# Patient Record
Sex: MALE | Race: WHITE | Employment: FULL TIME | URBAN - METROPOLITAN AREA
[De-identification: names, ages, dates, MRNs, and addresses within clinical notes are randomized per-mention and may not be internally consistent; named-entity substitution may affect disease eponyms.]

---

## 2019-06-10 ENCOUNTER — OFFICE VISIT (OUTPATIENT)
Dept: ORTHOPEDIC SURGERY | Age: 64
End: 2019-06-10
Payer: COMMERCIAL

## 2019-06-10 VITALS
HEART RATE: 75 BPM | SYSTOLIC BLOOD PRESSURE: 131 MMHG | WEIGHT: 230 LBS | BODY MASS INDEX: 31.15 KG/M2 | DIASTOLIC BLOOD PRESSURE: 91 MMHG | HEIGHT: 72 IN

## 2019-06-10 DIAGNOSIS — M54.42 ACUTE LEFT-SIDED LOW BACK PAIN WITH LEFT-SIDED SCIATICA: Primary | ICD-10-CM

## 2019-06-10 PROCEDURE — 99203 OFFICE O/P NEW LOW 30 MIN: CPT | Performed by: ORTHOPAEDIC SURGERY

## 2019-06-10 RX ORDER — OMEPRAZOLE 10 MG/1
20 CAPSULE, DELAYED RELEASE ORAL
COMMUNITY

## 2019-06-10 RX ORDER — METHYLPREDNISOLONE 4 MG/1
TABLET ORAL
Qty: 1 KIT | Refills: 0 | Status: SHIPPED | OUTPATIENT
Start: 2019-06-10 | End: 2022-03-17 | Stop reason: ALTCHOICE

## 2019-06-10 ASSESSMENT — ENCOUNTER SYMPTOMS
COUGH: 1
SINUS PAIN: 1
EYES NEGATIVE: 1
GASTROINTESTINAL NEGATIVE: 1
SINUS PRESSURE: 1

## 2019-06-10 NOTE — LETTER
[facility] [address] [facility] [id number]  Phone: [phone]  Fax: [phone]    Boris Rasmussen MD    [place] 10, 2019     Jose Hooker, [address] [place] [id number]    Patient: Queta Matias  MR Number: [id number]  YOB: 1955  Date of Visit: 6/10/2019    Dear Dr. Jose Hooker:    Thank you for the request for consultation for Queta Matias to me for the evaluation of left leg and back pain. Below are the relevant portions of my assessment and plan of care. Assessment:       Plan:     Subjective:      Patient ID: Queta Matias is a 70 y.o. male. HPI  Queta Matias is seen today for evaluation of his back and left leg. He says his left leg is got pain in the quad and numbness in the knee and big toe for the last 10 days after doing some exercises. He has a history of low back surgery in 2012 by Dr. Nicole Menjivar. Pain is currently 5 out of 10. He has pain if he walks or stands for more than 30 minutes. It wakes him at night. He is using ibuprofen and heating pad. He works in sales. Review of Systems   Constitutional: Negative. HENT: Positive for sinus pressure and sinus pain. Eyes: Negative. Respiratory: Positive for cough. Cardiovascular: Negative. Gastrointestinal: Negative. Endocrine: Negative. Genitourinary: Negative. Musculoskeletal: Positive for arthralgias. Left leg and knee pain   Skin: Negative. Allergic/Immunologic: Positive for environmental allergies. Neurological: Negative. Hematological: Negative. Psychiatric/Behavioral: Negative. Objective:   Physical Exam  General Exam:    Vitals: Blood pressure (!) 131/91, pulse 75, height 6' (1.829 m), weight 230 lb (104.3 kg). Constitutional: Patient is adequately groomed with no evidence of malnutrition  Mental Status: The patient is oriented to time, place and person.   The patient's mood and affect are appropriate. Gait:  Patient walks with normal gait and station. Lymphatic: The lymphatic examination bilaterally reveals all areas to be without enlargement or induration. Vascular: Examination reveals no swelling or calf tenderness. Peripheral pulses are palpable and 2+. Neurological: The patient has good coordination. There is no weakness or sensory deficit. Skin:    Head/Neck: inspection reveals no rashes, ulcerations or lesions. Trunk:  inspection reveals no rashes, ulcerations or lesions. Right Lower Extremity: inspection reveals no rashes, ulcerations or lesions. Left Lower Extremity: inspection reveals no rashes, ulcerations or lesions. Examination of the bilateral hips reveals normal flexion and extension. There is no restriction in rotation. There is no tenderness to palpation anteriorly posteriorly or laterally. Right-sided straight leg raise is negative. He has brisk patellar and Achilles reflexes and normal strength and sensation throughout the right lower extremity. On the left side he describes altered sensation in the thigh, knee, and shin and foot. He has mild discomfort with straight leg raise but no pain with seated straight leg raise. He has no restriction of motion of the hip. Straight leg raise is limited to only about 70 degrees. X-rays were obtained today AP and lateral views of lumbar spine which show multilevel disc narrowing and trace curve. Assessment:      His symptoms today seem most consistent with lumbar radiculopathy      Plan: We will start with Medrol Dosepak and therapy. If he still has trouble in 4 weeks we will get an MRI and refer him back to his spine surgeon                                                                                                                                                   This note was created using voice recognition software.  It has been proofread, but occasionally errors remain. Please disregard these errors. They will be corrected as they are noted. If you have questions, please do not hesitate to call me. I look forward to following Tati Martinez along with you.     Sincerely,        Devendra Durán MD

## 2019-06-10 NOTE — COMMUNICATION BODY
ulcerations or lesions. Left Lower Extremity: inspection reveals no rashes, ulcerations or lesions. Examination of the bilateral hips reveals normal flexion and extension. There is no restriction in rotation. There is no tenderness to palpation anteriorly posteriorly or laterally. Right-sided straight leg raise is negative. He has brisk patellar and Achilles reflexes and normal strength and sensation throughout the right lower extremity. On the left side he describes altered sensation in the thigh, knee, and shin and foot. He has mild discomfort with straight leg raise but no pain with seated straight leg raise. He has no restriction of motion of the hip. Straight leg raise is limited to only about 70 degrees. X-rays were obtained today AP and lateral views of lumbar spine which show multilevel disc narrowing and trace curve. Assessment:      His symptoms today seem most consistent with lumbar radiculopathy      Plan: We will start with Medrol Dosepak and therapy. If he still has trouble in 4 weeks we will get an MRI and refer him back to his spine surgeon                                                                                                                                                   This note was created using voice recognition software. It has been proofread, but occasionally errors remain. Please disregard these errors. They will be corrected as they are noted.

## 2019-06-10 NOTE — PROGRESS NOTES
Lower Extremity: inspection reveals no rashes, ulcerations or lesions. Examination of the bilateral hips reveals normal flexion and extension. There is no restriction in rotation. There is no tenderness to palpation anteriorly posteriorly or laterally. Right-sided straight leg raise is negative. He has brisk patellar and Achilles reflexes and normal strength and sensation throughout the right lower extremity. On the left side he describes altered sensation in the thigh, knee, and shin and foot. He has mild discomfort with straight leg raise but no pain with seated straight leg raise. He has no restriction of motion of the hip. Straight leg raise is limited to only about 70 degrees. X-rays were obtained today AP and lateral views of lumbar spine which show multilevel disc narrowing and trace curve. Assessment:      His symptoms today seem most consistent with lumbar radiculopathy      Plan: We will start with Medrol Dosepak and therapy. If he still has trouble in 4 weeks we will get an MRI and refer him back to his spine surgeon                                                                                                                                                   This note was created using voice recognition software. It has been proofread, but occasionally errors remain. Please disregard these errors. They will be corrected as they are noted.

## 2019-06-11 ENCOUNTER — HOSPITAL ENCOUNTER (OUTPATIENT)
Dept: PHYSICAL THERAPY | Age: 64
Setting detail: THERAPIES SERIES
Discharge: HOME | End: 2019-06-11
Payer: COMMERCIAL

## 2019-06-11 PROCEDURE — 97140 MANUAL THERAPY 1/> REGIONS: CPT | Performed by: PHYSICAL THERAPIST

## 2019-06-11 PROCEDURE — 97161 PT EVAL LOW COMPLEX 20 MIN: CPT | Performed by: PHYSICAL THERAPIST

## 2019-06-11 PROCEDURE — 97110 THERAPEUTIC EXERCISES: CPT | Performed by: PHYSICAL THERAPIST

## 2019-06-11 NOTE — FLOWSHEET NOTE
ROM LEFT RIGHT Comments   Hip Flexion      Hip Abd      Hip ER      Hip IR      Hip Extension      Knee Ext      Knee Flex      Hamstring Flex      Piriformis                    Strength LEFT RIGHT Comments   Multifidus      Transverse Ab      Hip Flexors      Hip Abductors      Hip Extensors                     Myotomes Normal Abnormal Comments   Hip flexion (L1-L2)      Knee extension (L2-L4)      Dorsiflexion (L4-L5)      Great Toe Ext (L5)      Ankle Eversion (S1-S2)      Ankle PF(S1-S2)          Dermatomes Normal Abnormal Comments   inguinal area (L1)       anterior mid-thigh (L2)      distal ant thigh/med knee (L3)      medial lower leg and foot (L4)      lateral lower leg and foot (L5)      posterior calf (S1)      medial calcaneus (S2)          Neural dynamic tension testing Normal Abnormal Comments   Slump Test  - Degree of knee flexion:       SLR       0-30      30-70      Femoral nerve (L2-4)        Reflexes Normal Abnormal Comments   S1-2 Seated achilles      S1-2 Prone knee bend      L3-4 Patellar tendon      C5-6 Biceps      C6 Brachioradialis      C7-8 Triceps      Clonus      Babinski      Montelongo's            Positional Tolerance     Standing repeated flex    Standing repeated ext    Prone laying    Prone prop    Prone laying with pillow    Supine manual B LE distraction    Supine with wedge under LE              RESTRICTIONS/PRECAUTIONS: recovering from sinus infection, discectomy- (L3-4?) in 2012, knee surgery in 2015        Exercises/Interventions:     Exercise/Equipment Resistance/Repetitions Other comments   Hamstring stretch 5x:30 bilaterally   Piriformis stretch NV?     Knee to chest 10x:10 Left only   Seated on heels low back stretch     Pelvic tilts 10x:10    TrA contraction     TrA contraction with alt marches     Prone press ups     BS     B hip abd with TB          Nerve glides seated 10x:10 bilaterally        Low lumbar rotation     bridges     Cat/camel      bird/dog Opposite UE to LE isometric core     TrA/mutlifidi walk outs     TrA/multifidi push outs                         SB pikes     SB knee tucks     SB roll outs          Manual tx 10' intermittent manual traction over ball in supine           Patient Education:  Patient education on PT and plan of care including diagnosis, prognosis, treatment goals and options. Patient agrees with discussed POC and treatment and is aware of rehab process. Pt was also educated on clinic layout and use of modalities. PT gave pt business card to call if any questions. Therapeutic Exercise and NMR EXR  ? (92990) Provided verbal/tactile cueing for activities related to strengthening, flexibility, endurance, ROM  for improvements in proximal hip and core control with self care, mobility, lifting and ambulation. ? (11742) Provided verbal/tactile cueing for activities related to improving balance, coordination, kinesthetic sense, posture, motor skill, proprioception  to assist with core control in self care, mobility, lifting, and ambulation.      Therapeutic Activities:    ? (86214 or 37501) Provided verbal/tactile cueing for activities related to improving balance, coordination, kinesthetic sense, posture, motor skill, proprioception and motor activation to allow for proper function  with self care and ADLs  ? (46172) Provided training and instruction to the patient for proper core and proximal hip recruitment and positioning with ambulation re-education     Home Exercise Program:    ? (14580) Reviewed/Progressed HEP activities related to strengthening, flexibility, endurance, ROM of core, proximal hip and LE for functional self-care, mobility, lifting and ambulation   ? (30524) Reviewed/Progressed HEP activities related to improving balance, coordination, kinesthetic sense, posture, motor skill, proprioception of core, proximal hip and LE for self care, mobility, lifting, and ambulation      Manual Treatments:  PROM / STM / Oscillations-Mobs:  G-I, II, III, IV (PA's, Inf., Post.)  ? (29571) Provided manual therapy to mobilize proximal hip and LS spine soft tissue/joints for the purpose of modulating pain, promoting relaxation,  increasing ROM, reducing/eliminating soft tissue swelling/inflammation/restriction, improving soft tissue extensibility and allowing for proper ROM for normal function with self care, mobility, lifting and ambulation. Modalities:  Not performed     Charges:  Timed Code Treatment Minutes: 30'   Total Treatment Minutes: 79'     ?x EVAL (LOW) [id number]   ? EVAL (MOD) 90360   ? EVAL (HIGH) 87371   ? RE-EVAL   ?x SJ(58205) x 1    ? IONTO  ? NMR (71025) x     ? VASO  ?x Manual (19597) x1      ? Other:  ? TA x      ? Mech Traction (69874)  ? ES(attended) (79256)      ? ES (un) (64837):       GOALS:  Patient stated goal: reduce painn    Therapist goals for Patient:   Short Term Goals: To be achieved in: 2 weeks  1. Pt will be independent in HEP and progression per patient tolerance in order to prevent re-injury. 2. Patient will report pain at worst less than or equal to 4/10 to facilitate improvement in movement, function, and ADLs as indicated by functional deficits. Long Term Goals: To be achieved in: 6 weeks  1. Pt will demo a score of 12% or less for the MARITA to assist with reaching prior level of function. 2. Patient will demonstrate increased AROM to left side flex without pain to allow for proper joint functioning as indicated by patient's functional deficits. 3. Patient will deny tingling/numbness in his leg. 4. Patient will return to report pain at worst less than or equal to 3/10.  5. Pt will perform all ADLs/IADLs without an increase in pain. 6. Pt will sleep/lay down without increased pain. Progression Towards Functional goals:  ? Patient is progressing as expected towards functional goals listed. ? Progression is slowed due to complexities listed.   ? Progression has been slowed due to co-morbidities. ? Plan just implemented, too soon to assess goals progression  ? Other:     ASSESSMENT:  See eval    Treatment/Activity Tolerance:  ? Patient tolerated treatment well ? Patient limited by fatigue  ? Patient limited by pain  ? Patient limited by other medical complications  ? Other: Pt is a 69 y/o male presenting with diagnosis of low back pain with sciatica from the MD.  Clinically, the pt presents with decreased ROM, decreased strength, decreased function, and increased pain consistent with the MD diagnosis. The pt would benefit from skilled PT to return to PLOF. Pt does have positive slump test.  He has sensation in \"numb\" areas, but it is decreased. Pt is recovering from respiratory infection. He does have a sedentary job. He has h/o discectomy. He has not yet started his Dosepak. He has had a good h/o with PT. We did review pt's benefits were removed. Pt's questions were answered. Pt was agreeable. We discussed orthonet. Since it may take up to 48 hrs to hear back from them, we will have to wait to start next week as pt is unavailable on Friday. Prognosis: ? Good ? Fair  ? Poor    Patient Requires Follow-up: ? Yes  ? No    PLAN: See eval.  Consider bike, progressing core strength, monitor pt's progress with starting Dosepak and traction. ? Continue per plan of care ? Alter current plan (see comments)  ? Plan of care initiated ? Hold pending MD visit ?  Discharge    Electronically signed by: Benigno Bill DPT [id number]

## 2019-06-11 NOTE — PLAN OF CARE
[facility],[address], [address] [place], [address]  Phone: [phone]   Fax: [phone]              Physical Therapy Certification    Dear Referring Practitioner: Kirstie Villarreal,    We had the pleasure of evaluating the following patient for physical therapy services at     [address] [place] [address]. A summary of our findings can be found in the initial assessment below. This includes our plan of care. If you have any questions or concerns regarding these findings, please do not hesitate to contact me at the office phone number checked above. Thank you for the referral.       Physician Signature:_______________________________Date:__________________  By signing above (or electronic signature), therapists plan is approved by physician      Patient: Queta Matias   : 1955   MRN: [id number]  Referring Physician: Referring Practitioner: Kirstie Villarreal      Evaluation Date: 2019      Medical Diagnosis Information:  Diagnosis: M54.42 (ICD-10-CM) - Acute left-sided low back pain with left-sided sciatica   Treatment Diagnosis: M54.42 (ICD-10-CM) - Acute left-sided low back pain with left-sided sciatica          Precautions/ Contra-indications: recovering from sinus infection, discectomy- (L3-4?) in , knee surgery in   Latex Allergy:  ?NO      ? YES  Preferred Language for Healthcare:   ?English       ? other:    SUBJECTIVE: Patient stated complaint: He had discectomy in . After his last visit with the spine surgeon, they wanted to fuse it. He wants to avoid this. He has numbness in a band on his left leg around mid/lower thigh and in his big toe. He was picking up his grandson a bunch at a pool. This has been bothering him for about 2 weeks. He took ibf and saw Dr. Kirstie Villarreal as he was concerned with the numbness. He never had this before. He's still golfing. He is trying to avoid surgery if he can.     He was prescribed a Doepak, but he hasn't started it yet. He feels his right knee is a little sore like there is something floating around in there. Relevant Medical History:  recovering from sinus infection, discectomy- (L3-4?) in 2012, knee surgery in 2015  Functional Scale:    MARITA-28%, FABQ- phy-22, work-6    Pain Scale: 0/10-  pressure in his butt  Easing factors: Pain at best:  0/10. Taking ibf now  Treatment: to fill Dosepak  Provocative factors: Pain at worst:  5-6/10. At night when he lays in bed. He can lay in the recliner with less problems. He has pain with standing, but this has been a chronic issue. He can't sit in the car for a long time, but this is his normal.  He can drive for about 2 hrs in the car, and then it starts to bother him. Type: ?Constant   ? xIntermittent  ? Radiating ? Localized ? other:     Numbness/Tingling: In his great toe on left and left thigh are numb    Functional Limitations/Impairments: ?xSitting- modifying ? xStanding-chronicall limited ? xWalking -chronically limited   ? xSquatting/bending  ? Stairs           ? xADL's- putting on shoes  ? Transfers ? xSports/Recreation ? Other:    Occupation/School: sales- sitting    Living Status/Prior Level of Function: Independent with ADLs and IADLs, no numbness. Pt did not have pain.         Standing Exam Normal Abnormal N/A Comments   Toe walk   x      Heel Walk x      Side bending  x  Pressure going to the left in left low back   Pelvic Height x      Fwd Bend- (aberrant juttering or innominate mvmt)  x  limited   Extension  x  limited   Trendelenburg       Kemps/Quadrant       Stork       SLS/SLS with rotation                     Seated Exam Normal Abnormal N/A Comments   Pelvic Height x      Seated Rotation x      Seated flexion x      B hip IR x                    Supine Exam Normal Abnormal N/A Comments   Hip flexion x      Abduction x      ER       IR    Restricted bilaterally   REJI/Hans  x  More restricted on left   Hip scour SLR x      Crossed SLR x      Supine to sit       SI distraction/compression       Thigh thrust                     Prone Exam Normal Abnormal N/A Comments   Prone knee bend x      Prone hip IR  x  Right more limited than left   B Achilles reflex/Pheasant       PA/Spring  x  stiff   Prone Instability test       Sacral Spring/thrust x                      ROM LEFT RIGHT Comments   Hip Flexion      Hip Abd      Hip ER 42 36    Hip IR 32 34    Hip Extension      Knee Ext      Knee Flex      Piriformis                    Strength LEFT RIGHT Comments   Multifidus      Transverse Ab      Hip Flexors      Hip Abductors      Hip Extensors                     Myotomes Normal Abnormal Comments   Hip flexion (L1-L2) x  4- bilaterally   Knee extension (L2-L4) x     Dorsiflexion (L4-L5) x     Great Toe Ext (L5) x     Ankle Eversion (S1-S2) x     Ankle PF(S1-S2) x  Via gross assessment       Dermatomes Normal Abnormal Comments   inguinal area (L1)  x     anterior mid-thigh (L2)  x On left feels different, but he does have sensation   distal ant thigh/med knee (L3)  x On left feels different, but he does have sensation   medial lower leg and foot (L4) x     lateral lower leg and foot (L5) x     posterior calf (S1) x     medial calcaneus (S2) x       Neural dynamic tension testing Normal Abnormal Comments   Slump Test  - Degree of knee flexion:   x Symptoms replicated on left   SLR       0-30      30-70   Right 52, left- 52   Femoral nerve (L2-4)        Reflexes Normal Abnormal Comments   S1-2 Seated achilles   Diminished bilaterally   S1-2 Prone knee bend      L3-4 Patellar tendon x     C5-6 Biceps      C6 Brachioradialis      C7-8 Triceps      Clonus x     Babinski      Montelongo's        Joint mobility:    ?Normal    ?xHypo   ? Hyper    Palpation: -TTP    Functional Mobility/Transfers: see above    Posture: FWD head.   Decreased lumbar lordosis    Gait: (include devices/WB status) WFL    Bandages/Dressings/Incisions: NA    Laslett Criteria - SI Component (2/5 for positive)   ? Distraction  ? Thigh Thrust  ?Gaenslen's test  ?Compression  ? Sacral thrust     [place] Criteria - Pelvic component  (3/4 for positive or 2/4 + David's Sign)   ? Standing Flexion test  ?Seated Pelvic Landmarks   ? Supine Long Sit Test  ?Prone Knee Flexion Test  ?David's Sign       Classification driving today's treatment approach and dosing:  - Prevailing signs and symptoms consistent with:  ? Symptom Modulation Approach   ? \"Directional preference subgroup\"    ? Flexion Based      ?age >50      ?imaging evidence of lumbar stenosis     ?preference for flexion     ? Extension Based     ?symptoms distal to buttock     ?symptoms centralize with lumbar extension     ?symptoms peripheralize with lumbar flexion     ?directional preference for extension    ? Lateral Shift     ?visible frontal plane deviation     ?directional preference for lateral translation   ? \"Manipulation subgroup\"  (3/4 meets manipulation criteria)     ? symptoms less than 16 days    ? FABQ less than 19    ? Hypomobility of lumbar spine    ? IR of at least 1 hip >35 degrees    Other Factors to consider:    ?no symptoms distal to the knee    ?no red flags and minimal yellow flags    ?no contraindications to manipulation   ?x \"Traction subgroup\"      ?x signs and symptoms of nerve root compression (radiculopathy)     ?x unable to centralize distal symptoms with movement    ?x pain or numbness in the lumbar spine, buttock, and/or LE    ?peripheralization with extension movements    ?+ SLR or + crossed SLR (symptoms less than 70 degrees)  ? Movement Control Approach   ? \"Stabilization subgroup\"    ? younger age (less than 36)     ? greater general flexibility (post-partum, SLR >90)    ? abberant movements, painful arc, or John's sign with flex/ext ROM    ? positive prone instability test  ?Functional Optimization Approach   ? \"unspecified subgroup\"    ? lower disability scores     ?lower fear avoidance scores     ?longer duration of symptoms (chronic)    ? prior episodes of low back pain    ?older age                                  ? Patient history, allergies, meds reviewed. Medical chart reviewed. See intake form. Review Of Systems (ROS):  ?Performed Review of systems (Integumentary, CardioPulmonary, Neurological) by intake and observation. Intake form has been scanned into medical record. Patient has been instructed to contact their primary care physician regarding ROS issues if not already being addressed at this time. Co-morbidities/Complexities (which will affect course of rehabilitation):   ? None           Arthritic conditions   ? Rheumatoid arthritis (M05.9)  ? Osteoarthritis (M19.91)   Cardiovascular conditions   ? Hypertension (I10)  ? Hyperlipidemia (E78.5)  ? Angina pectoris (I20)  ? Atherosclerosis (I70)   Musculoskeletal conditions   ? Disc pathology   ? Congenital spine pathologies   ? Prior surgical intervention  ? Osteoporosis (M81.8)  ? Osteopenia (M85.8)   Endocrine conditions   ? Hypothyroid (E03.9)  ? Hyperthyroid Gastrointestinal conditions   ? Constipation (S64.03)   Metabolic conditions   ? Morbid obesity (E66.01)  ? Diabetes type 1(E10.65) or 2 (E11.65)   ? Neuropathy (G60.9)     Pulmonary conditions   ? Asthma (J45)  ? Coughing   ? COPD (J44.9)   Psychological Disorders  ? Anxiety (F41.9)  ? Depression (F32.9)   ? Other:   ?Other:          Barriers to/and or personal factors that will affect rehab potential:              ?Age  ? Sex              ? Motivation/Lack of Motivation                        ? Co-Morbidities              ? Cognitive Function, education/learning barriers              ? Environmental, home barriers              ? profession/work barriers  ? past PT/medical experience  ? other:  Justification: Pt is recovering from respiratory infection. He does have a sedentary job. He has h/o discectomy. He has not yet started his Dosepak. He has had a good h/o with PT. Falls Risk Assessment (30 days):   ?  Falls Risk assessed and no intervention required. ? Falls Risk assessed and Patient requires intervention due to being higher risk   TUG score (>12s at risk):     ? Falls education provided, including           ASSESSMENT:   Functional Impairments:     ?xNoted lumbar/proximal hip hypomobility   ? xNoted lumbosacral and/or generalized hypermobility   ? Decreased Lumbosacral/hip/LE functional ROM   ?xDecreased core/proximal hip strength and neuromuscular control    ? Decreased LE functional strength    ? Abnormal reflexes/sensation/myotomal/dermatomal deficits  ? Reduced balance/proprioceptive control    ?other:      Functional Activity Limitations (from functional questionnaire and intake)   ? xReduced ability to tolerate prolonged functional positions   ? Reduced ability or difficulty with changes of positions or transfers between positions   ? xReduced ability to maintain good posture and demonstrate good body mechanics with sitting, bending, and lifting   ?s Reduced ability to sleep   ?s Reduced ability or tolerance with driving and/or computer work   ?xReduced ability to perform lifting, reaching, carrying tasks   ? xReduced ability to squat   ? Reduced ability to forward bend   ? xReduced ability to ambulate prolonged functional periods/distances/surfaces   ? Reduced ability to ascend/descend stairs   ? other:       Participation Restrictions   ? xReduced participation in self care activities   ? Reduced participation in home management activities   ? Reduced participation in work activities   ? Reduced participation in social activities. ? xReduced participation in [address] [place] [address] activities. Classification:   ?Signs/symptoms consistent with Lumbar instability/stabilization subgroup. ?Signs/symptoms consistent with Lumbar mobilization/manipulation subgroup, myotomes and dermatomes intact. Meets manipulation criteria. ? Signs/symptoms consistent with Lumbar direction specific/centralization subgroup   ? Signs/symptoms consistent with Lumbar traction subgroup     ? Signs/symptoms consistent with lumbar facet dysfunction   ? Signs/symptoms consistent with lumbar stenosis type dysfunction   ? Signs/symptoms consistent with nerve root involvement including myotome & dermatome dysfunction   ? Signs/symptoms consistent with post-surgical status including: decreased ROM, strength and function. ?signs/symptoms consistent with pathology which may benefit from Dry needling     ?other: Pt is a 71 y/o male presenting with diagnosis of low back pain with sciatica from the MD.  Clinically, the pt presents with decreased ROM, decreased strength, decreased function, and increased pain consistent with the MD diagnosis. The pt would benefit from skilled PT to return to PLOF. Pt does have positive slump test.  He has sensation in \"numb\" areas, but it is decreased. Pt is recovering from respiratory infection. He does have a sedentary job. He has h/o discectomy. He has not yet started his Dosepak. He has had a good h/o with PT. We did review pt's benefits were removed. Pt's questions were answered. Pt was agreeable. Prognosis/Rehab Potential:      ?Excellent   ?xGood    ? Fair   ? Poor    Tolerance of evaluation/treatment:    ?Excellent   ?xGood    ? Fair   ? Poor    PLAN: Begin PT focusing on: proximal hip mobilizations, LB mobs, LB core activation, proximal hip activation, and HEP    Frequency/Duration:  1-2 days per week for 4-6 Weeks:  Interventions:  ?  Therapeutic exercise including: strength training, ROM, for LE, Glutes and core   ? NMR activation and proprioception for glutes , LE and Core   ? Manual therapy as indicated for Hip complex, LE and spine to include: Dry Needling/IASTM, STM, PROM, Gr I-IV mobilizations, manipulation. ? Modalities as needed that may include: thermal agents, E-stim, Biofeedback, US, iontophoresis as indicated  ?   Patient education on joint protection, postural re-education, activity modification, progression of HEP. HEP instruction: (see scanned forms)    GOALS:  Patient stated goal: reduce painn    Therapist goals for Patient:   Short Term Goals: To be achieved in: 2 weeks  1. Pt will be independent in HEP and progression per patient tolerance in order to prevent re-injury. 2. Patient will report pain at worst less than or equal to 4/10 to facilitate improvement in movement, function, and ADLs as indicated by functional deficits. Long Term Goals: To be achieved in: 6 weeks  1. Pt will demo a score of 12% or less for the MARITA to assist with reaching prior level of function. 2. Patient will demonstrate increased AROM to left side flex without pain to allow for proper joint functioning as indicated by patient's functional deficits. 3. Patient will deny tingling/numbness in his leg. 4. Patient will return to report pain at worst less than or equal to 3/10.  5. Pt will perform all ADLs/IADLs without an increase in pain. 6. Pt will sleep/lay down without increased pain. Physical Therapy Evaluation Complexity Justification  ? A history of present problem with:  ? no personal factors and/or comorbidities that impact the plan of care;  ?x1-2 personal factors and/or comorbidities that impact the plan of care  ? 3 personal factors and/or comorbidities that impact the plan of care  ? An examination of body systems using standardized tests and measures addressing any of the following: body structures and functions (impairments), activity limitations, and/or participation restrictions;:  ? a total of 1-2 or more elements   ? a total of 3 or more elements   ? xa total of 4 or more elements   ? A clinical presentation with:  ?x stable and/or uncomplicated characteristics   ?  evolving clinical presentation with changing characteristics  ? unstable and unpredictable characteristics;   ? Clinical decision making of ? xlow, ? moderate, ? high complexity using standardized patient assessment instrument and/or measurable assessment of functional outcome. ? x EVAL (LOW) 32024 (typically 20 minutes face-to-face)  ? EVAL (MOD) 01271 (typically 30 minutes face-to-face)  ? EVAL (HIGH) 94542 (typically 45 minutes face-to-face)  ?  Len Ayers [id number]      Electronically signed by:  Lisa Brennan

## 2019-06-17 ENCOUNTER — HOSPITAL ENCOUNTER (OUTPATIENT)
Dept: PHYSICAL THERAPY | Age: 64
Setting detail: THERAPIES SERIES
Discharge: HOME | End: 2019-06-17
Payer: COMMERCIAL

## 2019-06-17 PROCEDURE — 97140 MANUAL THERAPY 1/> REGIONS: CPT | Performed by: PHYSICAL THERAPIST

## 2019-06-17 PROCEDURE — 97110 THERAPEUTIC EXERCISES: CPT | Performed by: PHYSICAL THERAPIST

## 2019-06-17 PROCEDURE — 97112 NEUROMUSCULAR REEDUCATION: CPT | Performed by: PHYSICAL THERAPIST

## 2019-06-17 NOTE — FLOWSHEET NOTE
Orthopaedics and [address] [place] [address] DR ENRIQUE CHAWLA    Physical Therapy Daily Treatment Note  Date:  2019    Patient Name:  [facility]    :  1955  MRN: [id number]  Medical/Treatment Diagnosis Information:  · Diagnosis: M54.42 (ICD-10-CM) - Acute left-sided low back pain with left-sided sciatica  · Treatment Diagnosis: M54.42 (ICD-10-CM) - Acute left-sided low back pain with left-sided sciatica  Insurance/Certification information:  PT Insurance Information: [employer]  Physician Information:  Referring Practitioner: [facility] of care signed (Y/N):     Date of Patient follow up with Physician: 2019    Functional Scale:  2019    MARITA-28%    Progress Note: ?  Yes  ? No  Next due by: Visit #10  Or 2019    Latex Allergy:  ?NO      ? YES  Preferred Language for Healthcare:   ?English       ? other:    Visit # Insurance Allowable   1 6 visits approved through 19; [facility] - 20 PCY     Pain level:  0/10     SUBJECTIVE:  Pt reports that he felt better after last session. He said the traction and the dosepak seemed to help. He doesn't really have pain anymore just numbness in front of thigh. He had more pain after golfing in [place]-medial thigh.      OBJECTIVE: See eval      Standing Exam Normal Abnormal N/A Comments   Toe walk         Heel Walk       Side bending       Pelvic Height       Fwd Bend- (aberrant juttering or innominate mvmt)       Extension       Trendelenburg       Kemps/Quadrant       Stork       SLS/SLS w rotation                     Seated Exam Normal Abnormal N/A Comments   Pelvic Height       Seated Rotation       Seated flexion       B hip IR                     Supine Exam Normal Abnormal N/A Comments   Hip flexion       Abduction       ER       IR       REJI/Hans       Hip scour       SLR       Crossed SLR       Supine to sit       SI distraction/compression       Hip thrust                     Prone Exam Normal Abnormal N/A Comments   Prone knee bend Prone hip IR       B Achilles reflex/Pheasant       PA/Spring       Prone Instability test       Sacral Spring/thrust                       ROM LEFT RIGHT Comments   Lumbar Flex      Lumbar Ext      Side Bend      Rotation                    ROM LEFT RIGHT Comments   Hip Flexion      Hip Abd      Hip ER      Hip IR      Hip Extension      Knee Ext      Knee Flex      Hamstring Flex      Piriformis                    Strength LEFT RIGHT Comments   Multifidus      Transverse Ab      Hip Flexors      Hip Abductors      Hip Extensors                     Myotomes Normal Abnormal Comments   Hip flexion (L1-L2)      Knee extension (L2-L4)      Dorsiflexion (L4-L5)      Great Toe Ext (L5)      Ankle Eversion (S1-S2)      Ankle PF(S1-S2)          Dermatomes Normal Abnormal Comments   inguinal area (L1)       anterior mid-thigh (L2)      distal ant thigh/med knee (L3)      medial lower leg and foot (L4)      lateral lower leg and foot (L5)      posterior calf (S1)      medial calcaneus (S2)          Neural dynamic tension testing Normal Abnormal Comments   Slump Test  - Degree of knee flexion:       SLR       0-30      30-70      Femoral nerve (L2-4)        Reflexes Normal Abnormal Comments   S1-2 Seated achilles      S1-2 Prone knee bend      L3-4 Patellar tendon      C5-6 Biceps      C6 Brachioradialis      C7-8 Triceps      Clonus      Babinski      Montelongo's            Positional Tolerance     Standing repeated flex    Standing repeated ext    Prone laying    Prone prop    Prone laying with pillow    Supine manual B LE distraction    Supine with wedge under LE              RESTRICTIONS/PRECAUTIONS: recovering from sinus infection, discectomy- (L3-4?) in 2012, knee surgery in 2015        Exercises/Interventions:     Exercise/Equipment Resistance/Repetitions Other comments   bike X 5 mins    Hamstring stretch 3x:30 bilaterally   Piriformis stretch Unable to cross and pull    Knee to chest 10x:10 Left only   Seated on heels low back stretch     Pelvic tilts 10x:10    TrA contraction     TrA contraction with alt marches 3 x 5  B    Prone press ups     BS 10 x 10 secs     B hip abd with TB 3 x 10 black TB         Nerve glides seated 10x:10 bilaterally        Low lumbar rotation 5 x 10 secs B    bridges     Cat/camel      bird/dog     Opposite UE to LE isometric core     TrA/mutlifidi walk outs     TrA/multifidi push outs                         SB pikes     SB knee tucks     SB roll outs          Manual tx 10' intermittent manual traction over ball in supine           Patient Education:  Patient education on PT and plan of care including diagnosis, prognosis, treatment goals and options. Patient agrees with discussed POC and treatment and is aware of rehab process. Pt was also educated on clinic layout and use of modalities. PT gave pt business card to call if any questions. Therapeutic Exercise and NMR EXR  ? (34916) Provided verbal/tactile cueing for activities related to strengthening, flexibility, endurance, ROM  for improvements in proximal hip and core control with self care, mobility, lifting and ambulation. ? (55360) Provided verbal/tactile cueing for activities related to improving balance, coordination, kinesthetic sense, posture, motor skill, proprioception  to assist with core control in self care, mobility, lifting, and ambulation.      Therapeutic Activities:    ? (16197 or 56747) Provided verbal/tactile cueing for activities related to improving balance, coordination, kinesthetic sense, posture, motor skill, proprioception and motor activation to allow for proper function  with self care and ADLs  ? (06778) Provided training and instruction to the patient for proper core and proximal hip recruitment and positioning with ambulation re-education     Home Exercise Program:    ? (17494) Reviewed/Progressed HEP activities related to strengthening, flexibility, endurance, ROM of core, proximal hip and LE for functional self-care, mobility, lifting and ambulation   ? (01626) Reviewed/Progressed HEP activities related to improving balance, coordination, kinesthetic sense, posture, motor skill, proprioception of core, proximal hip and LE for self care, mobility, lifting, and ambulation      Manual Treatments:  PROM / STM / Oscillations-Mobs:  G-I, II, III, IV (PA's, Inf., Post.)  x? (39010) Provided manual therapy to mobilize proximal hip and LS spine soft tissue/joints for the purpose of modulating pain, promoting relaxation,  increasing ROM, reducing/eliminating soft tissue swelling/inflammation/restriction, improving soft tissue extensibility and allowing for proper ROM for normal function with self care, mobility, lifting and ambulation. Modalities:  Not performed     Charges:  Timed Code Treatment Minutes: 42   Total Treatment Minutes: 52     ? EVAL (LOW) 51071   ? EVAL (MOD) 95907   ? EVAL (HIGH) 50327   ? RE-EVAL   ?x AL(33196) x 1    ? IONTO  ? xNMR (69431) x   1  ? VASO  ?x Manual (42126) x1      ? Other:  ? TA x      ? Mech Traction (12329)  ? ES(attended) (24339)      ? ES (un) (38756):       GOALS:  Patient stated goal: reduce painn    Therapist goals for Patient:   Short Term Goals: To be achieved in: 2 weeks  1. Pt will be independent in HEP and progression per patient tolerance in order to prevent re-injury. 2. Patient will report pain at worst less than or equal to 4/10 to facilitate improvement in movement, function, and ADLs as indicated by functional deficits. Long Term Goals: To be achieved in: 6 weeks  1. Pt will demo a score of 12% or less for the MARITA to assist with reaching prior level of function. 2. Patient will demonstrate increased AROM to left side flex without pain to allow for proper joint functioning as indicated by patient's functional deficits. 3. Patient will deny tingling/numbness in his leg.   4. Patient will return to report pain at worst less than or equal to 3/10.  5. Pt

## 2019-06-20 ENCOUNTER — HOSPITAL ENCOUNTER (OUTPATIENT)
Dept: PHYSICAL THERAPY | Age: 64
Setting detail: THERAPIES SERIES
Discharge: HOME | End: 2019-06-20
Payer: COMMERCIAL

## 2019-06-20 PROCEDURE — 97140 MANUAL THERAPY 1/> REGIONS: CPT | Performed by: PHYSICAL THERAPIST

## 2019-06-20 PROCEDURE — 97110 THERAPEUTIC EXERCISES: CPT | Performed by: PHYSICAL THERAPIST

## 2019-06-20 PROCEDURE — 97112 NEUROMUSCULAR REEDUCATION: CPT | Performed by: PHYSICAL THERAPIST

## 2019-06-20 NOTE — FLOWSHEET NOTE
Orthopaedics and [address] [place] [address] DR ENRIQUE CHAWLA    Physical Therapy Daily Treatment Note  Date:  2019    Patient Name:  Jaleel Gonzalez    :  1955  MRN: [id number]  Medical/Treatment Diagnosis Information:  · Diagnosis: M54.42 (ICD-10-CM) - Acute left-sided low back pain with left-sided sciatica  · Treatment Diagnosis: M54.42 (ICD-10-CM) - Acute left-sided low back pain with left-sided sciatica  Insurance/Certification information:  PT Insurance Information: [place]/[employer]  Physician Information:  Referring Practitioner: [address] [facility] of care signed (Y/N):     Date of Patient follow up with Physician: 2019    Functional Scale:  2019    MARITA-28%    Progress Note: ?  Yes  ? No  Next due by: Visit #10  Or 2019    Latex Allergy:  ?NO      ? YES  Preferred Language for Healthcare:   ?English       ? other:    Visit # Insurance Allowable   2 6 visits approved through 19; [facility] - 20 PCY     Pain level:  0/10     SUBJECTIVE:  Pt is having more pain at night in medial thigh and into medial knee. He felt fine after last session.  He is still doing exercises at home    OBJECTIVE: See eval      Standing Exam Normal Abnormal N/A Comments   Toe walk         Heel Walk       Side bending       Pelvic Height       Fwd Bend- (aberrant juttering or innominate mvmt)       Extension       Trendelenburg       Kemps/Quadrant       Stork       SLS/SLS w rotation                     Seated Exam Normal Abnormal N/A Comments   Pelvic Height       Seated Rotation       Seated flexion       B hip IR                     Supine Exam Normal Abnormal N/A Comments   Hip flexion       Abduction       ER       IR       REJI/Hans       Hip scour       SLR       Crossed SLR       Supine to sit       SI distraction/compression       Hip thrust                     Prone Exam Normal Abnormal N/A Comments   Prone knee bend       Prone hip IR       B Achilles reflex/Pheasant       PA/Spring       Prone with alt marches 3 x 5  B    Prone press ups     BS 10 x 10 secs     B hip abd with TB 3 x 10 black TB         Nerve glides seated 10x:10 bilaterally        Low lumbar rotation 5 x 10 secs B    bridges 3 x 5 hold 3 secs    Cat/camel      bird/dog     Opposite UE to LE isometric core     TrA/mutlifidi walk outs     TrA/multifidi push outs                         SB pikes     SB knee tucks     SB roll outs          Manual tx 8' intermittent manual traction over ball in supine           Patient Education:  Patient education on PT and plan of care including diagnosis, prognosis, treatment goals and options. Patient agrees with discussed POC and treatment and is aware of rehab process. Pt was also educated on clinic layout and use of modalities. PT gave pt business card to call if any questions. Therapeutic Exercise and NMR EXR  ? (19335) Provided verbal/tactile cueing for activities related to strengthening, flexibility, endurance, ROM  for improvements in proximal hip and core control with self care, mobility, lifting and ambulation. ? (07174) Provided verbal/tactile cueing for activities related to improving balance, coordination, kinesthetic sense, posture, motor skill, proprioception  to assist with core control in self care, mobility, lifting, and ambulation.      Therapeutic Activities:    ? (45192 or 57922) Provided verbal/tactile cueing for activities related to improving balance, coordination, kinesthetic sense, posture, motor skill, proprioception and motor activation to allow for proper function  with self care and ADLs  ? (57991) Provided training and instruction to the patient for proper core and proximal hip recruitment and positioning with ambulation re-education     Home Exercise Program:    ? (12545) Reviewed/Progressed HEP activities related to strengthening, flexibility, endurance, ROM of core, proximal hip and LE for functional self-care, mobility, lifting and ambulation   ? (10774) Reviewed/Progressed HEP activities related to improving balance, coordination, kinesthetic sense, posture, motor skill, proprioception of core, proximal hip and LE for self care, mobility, lifting, and ambulation      Manual Treatments:  PROM / STM / Oscillations-Mobs:  G-I, II, III, IV (PA's, Inf., Post.)  x? (68725) Provided manual therapy to mobilize proximal hip and LS spine soft tissue/joints for the purpose of modulating pain, promoting relaxation,  increasing ROM, reducing/eliminating soft tissue swelling/inflammation/restriction, improving soft tissue extensibility and allowing for proper ROM for normal function with self care, mobility, lifting and ambulation. Modalities:  Not performed     Charges:  Timed Code Treatment Minutes: 45   Total Treatment Minutes: 48     ? EVAL (LOW) 22050   ? EVAL (MOD) 42467   ? EVAL (HIGH) 93039   ? RE-EVAL   ?x WR(49604) x 1    ? IONTO  ? xNMR (64865) x   1  ? VASO  ?x Manual (54706) x1      ? Other:  ? TA x      ? Mech Traction (22578)  ? ES(attended) (26042)      ? ES (un) (82930):       GOALS:  Patient stated goal: reduce painn    Therapist goals for Patient:   Short Term Goals: To be achieved in: 2 weeks  1. Pt will be independent in HEP and progression per patient tolerance in order to prevent re-injury. 2. Patient will report pain at worst less than or equal to 4/10 to facilitate improvement in movement, function, and ADLs as indicated by functional deficits. Long Term Goals: To be achieved in: 6 weeks  1. Pt will demo a score of 12% or less for the MARITA to assist with reaching prior level of function. 2. Patient will demonstrate increased AROM to left side flex without pain to allow for proper joint functioning as indicated by patient's functional deficits. 3. Patient will deny tingling/numbness in his leg. 4. Patient will return to report pain at worst less than or equal to 3/10.  5. Pt will perform all ADLs/IADLs without an increase in pain.   6. Pt will

## 2019-06-25 ENCOUNTER — HOSPITAL ENCOUNTER (OUTPATIENT)
Dept: PHYSICAL THERAPY | Age: 64
Setting detail: THERAPIES SERIES
Discharge: HOME | End: 2019-06-25
Payer: COMMERCIAL

## 2019-06-25 PROCEDURE — 97110 THERAPEUTIC EXERCISES: CPT | Performed by: PHYSICAL THERAPIST

## 2019-06-25 PROCEDURE — 97112 NEUROMUSCULAR REEDUCATION: CPT | Performed by: PHYSICAL THERAPIST

## 2019-06-25 PROCEDURE — 97140 MANUAL THERAPY 1/> REGIONS: CPT | Performed by: PHYSICAL THERAPIST

## 2019-06-25 NOTE — FLOWSHEET NOTE
PA/Spring       Prone Instability test       Sacral Spring/thrust                       ROM LEFT RIGHT Comments   Lumbar Flex      Lumbar Ext      Side Bend      Rotation                    ROM LEFT RIGHT Comments   Hip Flexion      Hip Abd      Hip ER      Hip IR      Hip Extension      Knee Ext      Knee Flex      Hamstring Flex      Piriformis                    Strength LEFT RIGHT Comments   Multifidus      Transverse Ab      Hip Flexors      Hip Abductors      Hip Extensors                     Myotomes Normal Abnormal Comments   Hip flexion (L1-L2)      Knee extension (L2-L4)      Dorsiflexion (L4-L5)      Great Toe Ext (L5)      Ankle Eversion (S1-S2)      Ankle PF(S1-S2)          Dermatomes Normal Abnormal Comments   inguinal area (L1)       anterior mid-thigh (L2)      distal ant thigh/med knee (L3)      medial lower leg and foot (L4)      lateral lower leg and foot (L5)      posterior calf (S1)      medial calcaneus (S2)          Neural dynamic tension testing Normal Abnormal Comments   Slump Test  - Degree of knee flexion:       SLR       0-30      30-70      Femoral nerve (L2-4)        Reflexes Normal Abnormal Comments   S1-2 Seated achilles      S1-2 Prone knee bend      L3-4 Patellar tendon      C5-6 Biceps      C6 Brachioradialis      C7-8 Triceps      Clonus      Babinski      Montelongo's            Positional Tolerance     Standing repeated flex    Standing repeated ext    Prone laying    Prone prop    Prone laying with pillow    Supine manual B LE distraction    Supine with wedge under LE              RESTRICTIONS/PRECAUTIONS: recovering from sinus infection, discectomy- (L3-4?) in 2012, knee surgery in 2015        Exercises/Interventions:     Exercise/Equipment Resistance/Repetitions Other comments   bike X 5 mins    Hamstring stretch 3x:30 bilaterally   Piriformis stretch Unable to cross and pull    Knee to chest 10x:10 Left only   Seated on heels low back stretch     Pelvic tilts 1    TrA contraction 10x:10 Modified plantagrade  Also in hooklying with biofeedback   TrA contraction with alt marches 3 x 5  B Held due to inability to do properly. Did ab braces with biofeedback instead   Prone press ups     gastroc stretch 5x:30             SLR ABD 3x10    SLR ADD 3x10    Eversion isometric 10x:10              Nerve glides seated 10x:10 bilaterally   retlouping 20x Hold NV   Low lumbar rotation 5 x 10 secs B    bridges 3 x 5 hold 3 secs    Cat/camel      bird/dog     Opposite UE to LE isometric core     TrA/mutlifidi walk outs     TrA/multifidi push outs                         SB pikes     SB knee tucks     SB roll outs          Manual tx 10' intermittent manual traction over ball in supine           Patient Education:  Patient education on PT and plan of care including diagnosis, prognosis, treatment goals and options. Patient agrees with discussed POC and treatment and is aware of rehab process. Pt was also educated on clinic layout and use of modalities. PT gave pt business card to call if any questions. Therapeutic Exercise and NMR EXR  ? (97007) Provided verbal/tactile cueing for activities related to strengthening, flexibility, endurance, ROM  for improvements in proximal hip and core control with self care, mobility, lifting and ambulation. ? (81809) Provided verbal/tactile cueing for activities related to improving balance, coordination, kinesthetic sense, posture, motor skill, proprioception  to assist with core control in self care, mobility, lifting, and ambulation.      Therapeutic Activities:    ? (54005 or 24383) Provided verbal/tactile cueing for activities related to improving balance, coordination, kinesthetic sense, posture, motor skill, proprioception and motor activation to allow for proper function  with self care and ADLs  ? (36498) Provided training and instruction to the patient for proper core and proximal hip recruitment and positioning with ambulation re-education Home Exercise Program:    ? (73225) Reviewed/Progressed HEP activities related to strengthening, flexibility, endurance, ROM of core, proximal hip and LE for functional self-care, mobility, lifting and ambulation   ? (87822) Reviewed/Progressed HEP activities related to improving balance, coordination, kinesthetic sense, posture, motor skill, proprioception of core, proximal hip and LE for self care, mobility, lifting, and ambulation      Manual Treatments:  PROM / STM / Oscillations-Mobs:  G-I, II, III, IV (PA's, Inf., Post.)  x? (15047) Provided manual therapy to mobilize proximal hip and LS spine soft tissue/joints for the purpose of modulating pain, promoting relaxation,  increasing ROM, reducing/eliminating soft tissue swelling/inflammation/restriction, improving soft tissue extensibility and allowing for proper ROM for normal function with self care, mobility, lifting and ambulation. Modalities:  Not performed     Charges:   Timed Code Treatment Minutes: 45'   Total Treatment Minutes: 76'     ? EVAL (LOW) 73717   ? EVAL (MOD) 69628   ? EVAL (HIGH) 04493   ? RE-EVAL   ?x IE(58392) x 1    ? IONTO  ? xNMR (14482) x   1  ? VASO  ?x Manual (52602) x1      ? Other:  ? TA x      ? Mech Traction (49725)  ? ES(attended) (38776)      ? ES (un) (61915):       GOALS:  Patient stated goal: reduce painn    Therapist goals for Patient:   Short Term Goals: To be achieved in: 2 weeks  1. Pt will be independent in HEP and progression per patient tolerance in order to prevent re-injury. 2. Patient will report pain at worst less than or equal to 4/10 to facilitate improvement in movement, function, and ADLs as indicated by functional deficits. Long Term Goals: To be achieved in: 6 weeks  1. Pt will demo a score of 12% or less for the MARITA to assist with reaching prior level of function.    2. Patient will demonstrate increased AROM to left side flex without pain to allow for proper joint functioning as indicated by patient's functional deficits. 3. Patient will deny tingling/numbness in his leg. 4. Patient will return to report pain at worst less than or equal to 3/10.  5. Pt will perform all ADLs/IADLs without an increase in pain. 6. Pt will sleep/lay down without increased pain. Progression Towards Functional goals:  ? Patient is progressing as expected towards functional goals listed. ? Progression is slowed due to complexities listed. ? Progression has been slowed due to co-morbidities. ? Plan just implemented, too soon to assess goals progression  ? Other:     ASSESSMENT:  See eval    Treatment/Activity Tolerance:  ? Patient tolerated treatment well ? Patient limited by fatigue  ? Patient limited by pain  ? Patient limited by other medical complications  x? Other: Pt dave tx well. He has a difficult time isolating Transverse abdominus from rectus. We did try in modified plantigrade, which he was improving on. Pt was TTP over peroneals, which he reports is the same symptoms he got from his back. He has no c/o radiating pain over his thigh though. He will continue to benefit from manual traction and exercise progressions      Prognosis: ? Good ? Fair  ? Poor    Patient Requires Follow-up: ? Yes  ? No    PLAN: See eval.  continue with traction; Consider requesting more visits. Rafat Perez may be changing next month though. ?  Continue per plan of care ? Alter current plan (see comments)   Plan of care initiated ? Hold pending MD visit ?  Discharge    Electronically signed by: Hortensia Peterson DPT [id number]

## 2019-06-27 ENCOUNTER — HOSPITAL ENCOUNTER (OUTPATIENT)
Dept: PHYSICAL THERAPY | Age: 64
Setting detail: THERAPIES SERIES
Discharge: HOME | End: 2019-06-27
Payer: COMMERCIAL

## 2019-06-27 PROCEDURE — 97112 NEUROMUSCULAR REEDUCATION: CPT | Performed by: PHYSICAL THERAPIST

## 2019-06-27 PROCEDURE — 97110 THERAPEUTIC EXERCISES: CPT | Performed by: PHYSICAL THERAPIST

## 2019-06-27 PROCEDURE — 97140 MANUAL THERAPY 1/> REGIONS: CPT | Performed by: PHYSICAL THERAPIST

## 2019-06-27 NOTE — FLOWSHEET NOTE
Orthopaedics and [address] [place] [address] DR ENRIQUE CHAWLA    Physical Therapy Daily Treatment Note  Date:  2019    Patient Name:  Reginald Lopez    :  1955  MRN: [id number]  Medical/Treatment Diagnosis Information:  · Diagnosis: M54.42 (ICD-10-CM) - Acute left-sided low back pain with left-sided sciatica  · Treatment Diagnosis: M54.42 (ICD-10-CM) - Acute left-sided low back pain with left-sided sciatica  Insurance/Certification information:  PT Insurance Information: [place]/[employer]  Physician Information:  Referring Practitioner: [address] [facility] of care signed (Y/N):     Date of Patient follow up with Physician: 2019    Functional Scale:  2019    MARITA-28%    Progress Note: ?  Yes  ? No  Next due by: Visit #10  Or 2019    Latex Allergy:  ?NO      ? YES  Preferred Language for Healthcare:   ?English       ? other:    Visit # Insurance Allowable   4 (5 total) 6 visits approved through 19; [facility] - 20 PCY     Pain level:  5/10     SUBJECTIVE:  It's painful today. It's on the inside of his knee. He doesn't feel the calf stretch helped. Pt feels 70% return to normal.  He had to sit in a hard chair for a couple hrs last night, and that really bothered him.       OBJECTIVE: 2019     -TTP over medial knee-VMO or peroneals or low back       Standing Exam Normal Abnormal N/A Comments   Toe walk         Heel Walk       Side bending       Pelvic Height       Fwd Bend- (aberrant juttering or innominate mvmt)       Extension       Trendelenburg       Kemps/Quadrant       Stork       SLS/SLS w rotation                     Seated Exam Normal Abnormal N/A Comments   Pelvic Height       Seated Rotation       Seated flexion       B hip IR                     Supine Exam Normal Abnormal N/A Comments   Hip flexion       Abduction       ER       IR       REJI/Hans       Hip scour       SLR       Crossed SLR       Supine to sit       SI distraction/compression       Hip thrust to cross and pull    Knee to chest 10x:10 Left only   Seated on heels low back stretch     Pelvic tilts 1    TrA contraction 10x:10 Modified plantagrade  Also in hooklying with biofeedback   TrA contraction with alt marches Prone press ups     gastroc stretch              SLR ABD 3x10    SLR ADD 3x10                   Nerve glides seated 10x:10 bilaterally   retlouping     Low lumbar rotation 10 x 10 secs B    bridges 3 x 5 hold 3 secs    Cat/camel      bird/dog     Opposite UE to LE isometric core     TrA/mutlifidi walk outs     TrA/multifidi push outs                         SB pikes     SB knee tucks     SB roll outs          Manual tx 10' intermittent manual traction over ball in supine           Patient Education:  Patient education on PT and plan of care including diagnosis, prognosis, treatment goals and options. Patient agrees with discussed POC and treatment and is aware of rehab process. Pt was also educated on clinic layout and use of modalities. PT gave pt business card to call if any questions. Therapeutic Exercise and NMR EXR  ? (91019) Provided verbal/tactile cueing for activities related to strengthening, flexibility, endurance, ROM  for improvements in proximal hip and core control with self care, mobility, lifting and ambulation. ? (40735) Provided verbal/tactile cueing for activities related to improving balance, coordination, kinesthetic sense, posture, motor skill, proprioception  to assist with core control in self care, mobility, lifting, and ambulation.      Therapeutic Activities:    ? (52628 or 02075) Provided verbal/tactile cueing for activities related to improving balance, coordination, kinesthetic sense, posture, motor skill, proprioception and motor activation to allow for proper function  with self care and ADLs  ? (75376) Provided training and instruction to the patient for proper core and proximal hip recruitment and positioning with ambulation re-education     Home Exercise Program:    ? (82391) Reviewed/Progressed HEP activities related to strengthening, flexibility, endurance, ROM of core, proximal hip and LE for functional self-care, mobility, lifting and ambulation   ? (13977) Reviewed/Progressed HEP activities related to improving balance, coordination, kinesthetic sense, posture, motor skill, proprioception of core, proximal hip and LE for self care, mobility, lifting, and ambulation      Manual Treatments:  PROM / STM / Oscillations-Mobs:  G-I, II, III, IV (PA's, Inf., Post.)  x? (79074) Provided manual therapy to mobilize proximal hip and LS spine soft tissue/joints for the purpose of modulating pain, promoting relaxation,  increasing ROM, reducing/eliminating soft tissue swelling/inflammation/restriction, improving soft tissue extensibility and allowing for proper ROM for normal function with self care, mobility, lifting and ambulation. Modalities:  Not performed     Charges:   Timed Code Treatment Minutes: 37'   Total Treatment Minutes: 54'     ? EVAL (LOW) 36176   ? EVAL (MOD) 23661   ? EVAL (HIGH) 11697   ? RE-EVAL   ?x OW(82985) x 1    ? IONTO  ? xNMR (57989) x   1  ? VASO  ?x Manual (86204) x1      ? Other:  ? TA x      ? Mech Traction (20206)  ? ES(attended) (98818)      ? ES (un) (28989):       GOALS:  Patient stated goal: reduce painn    Therapist goals for Patient:   Short Term Goals: To be achieved in: 2 weeks  1. Pt will be independent in HEP and progression per patient tolerance in order to prevent re-injury. 2. Patient will report pain at worst less than or equal to 4/10 to facilitate improvement in movement, function, and ADLs as indicated by functional deficits. Long Term Goals: To be achieved in: 6 weeks  1. Pt will demo a score of 12% or less for the MARITA to assist with reaching prior level of function.    2. Patient will demonstrate increased AROM to left side flex without pain to allow for proper joint functioning as indicated by patient's functional deficits. 3. Patient will deny tingling/numbness in his leg. 4. Patient will return to report pain at worst less than or equal to 3/10.  5. Pt will perform all ADLs/IADLs without an increase in pain. 6. Pt will sleep/lay down without increased pain. Progression Towards Functional goals:  ? Patient is progressing as expected towards functional goals listed. ? Progression is slowed due to complexities listed. ? Progression has been slowed due to co-morbidities. ? Plan just implemented, too soon to assess goals progression  ? Other:     ASSESSMENT:      Treatment/Activity Tolerance:  ? Patient tolerated treatment well ? Patient limited by fatigue  ? Patient limited by pain  ? Patient limited by other medical complications  x? Other: Pt dave tx well. We did modify his routine compared to last visit. His pain did decrease to nearly 0 after doing the exercises. He feels he has made improvements since starting tx. His symptoms were different today than last visit. I could not elicit any TTP today. He did report pain over his right low back. Pt is to see MD next week. He will f/u pending this appointment. He will continue to benefit from manual traction and exercise progressions      Prognosis: ? Good ? Fair  ? Poor    Patient Requires Follow-up: ? Yes  ? No    PLAN: See eval.  continue with traction; Consider requesting more visits. Sravan Connors may be changing next month though. If pt does not return, this note can be considered a D/C note. ? Continue per plan of care ? Alter current plan (see comments)   Plan of care initiated ? Hold pending MD visit ?  Discharge    Electronically signed by: Brandy Blanco DPT [id number]

## 2019-07-01 ENCOUNTER — OFFICE VISIT (OUTPATIENT)
Dept: ORTHOPEDIC SURGERY | Age: 64
End: 2019-07-01
Payer: COMMERCIAL

## 2019-07-01 VITALS
SYSTOLIC BLOOD PRESSURE: 155 MMHG | RESPIRATION RATE: 12 BRPM | HEIGHT: 72 IN | WEIGHT: 230 LBS | DIASTOLIC BLOOD PRESSURE: 86 MMHG | BODY MASS INDEX: 31.15 KG/M2 | HEART RATE: 86 BPM

## 2019-07-01 DIAGNOSIS — M54.42 ACUTE LEFT-SIDED LOW BACK PAIN WITH LEFT-SIDED SCIATICA: Primary | ICD-10-CM

## 2019-07-01 PROCEDURE — 99212 OFFICE O/P EST SF 10 MIN: CPT | Performed by: ORTHOPAEDIC SURGERY

## 2019-07-03 ENCOUNTER — TELEPHONE (OUTPATIENT)
Dept: ORTHOPEDIC SURGERY | Age: 64
End: 2019-07-03

## 2019-07-03 NOTE — TELEPHONE ENCOUNTER
Called patient regarding MRI results. Notified patient that Dr. Ry Lewis has reviewed his lumbar spine MRI and that he needs to see his spine surgeon for recurrent disc herniation. Patient stated that he did not wish to see Dr. Fransisco Booker again. Patient requesting a different physician at [address] [place] [address]. Patient referred to Dr. Cj Corona. Referral, MRI, and Dr. Ry Lewis records faxed to Dr. Cj Corona.

## 2022-03-17 ENCOUNTER — OFFICE VISIT (OUTPATIENT)
Dept: ORTHOPEDIC SURGERY | Age: 67
End: 2022-03-17
Payer: MEDICARE

## 2022-03-17 VITALS — HEIGHT: 72 IN | BODY MASS INDEX: 32.51 KG/M2 | WEIGHT: 240 LBS

## 2022-03-17 DIAGNOSIS — M75.82 ROTATOR CUFF TENDINITIS, LEFT: ICD-10-CM

## 2022-03-17 DIAGNOSIS — M25.512 ACUTE PAIN OF LEFT SHOULDER: Primary | ICD-10-CM

## 2022-03-17 PROCEDURE — 20610 DRAIN/INJ JOINT/BURSA W/O US: CPT | Performed by: ORTHOPAEDIC SURGERY

## 2022-03-17 PROCEDURE — 99214 OFFICE O/P EST MOD 30 MIN: CPT | Performed by: ORTHOPAEDIC SURGERY

## 2022-03-17 RX ORDER — FLUTICASONE PROPIONATE 50 MCG
2 SPRAY, SUSPENSION (ML) NASAL DAILY
COMMUNITY
Start: 2021-11-29

## 2022-03-17 RX ORDER — METHYLPREDNISOLONE ACETATE 40 MG/ML
80 INJECTION, SUSPENSION INTRA-ARTICULAR; INTRALESIONAL; INTRAMUSCULAR; SOFT TISSUE ONCE
Status: COMPLETED | OUTPATIENT
Start: 2022-03-17 | End: 2022-03-17

## 2022-03-17 RX ORDER — CETIRIZINE HYDROCHLORIDE 10 MG/1
10 TABLET ORAL DAILY
COMMUNITY

## 2022-03-17 RX ORDER — LIDOCAINE HYDROCHLORIDE 10 MG/ML
4 INJECTION, SOLUTION INFILTRATION; PERINEURAL ONCE
Status: COMPLETED | OUTPATIENT
Start: 2022-03-17 | End: 2022-03-17

## 2022-03-17 RX ADMIN — LIDOCAINE HYDROCHLORIDE 4 ML: 10 INJECTION, SOLUTION INFILTRATION; PERINEURAL at 09:40

## 2022-03-17 RX ADMIN — METHYLPREDNISOLONE ACETATE 80 MG: 40 INJECTION, SUSPENSION INTRA-ARTICULAR; INTRALESIONAL; INTRAMUSCULAR; SOFT TISSUE at 09:41

## 2022-03-17 NOTE — PROGRESS NOTES
Hale's maneuver is normal.  There is no pain or apprehension in the abducted externally rotated position. There is no sulcus sign. There is no instability with anterior or posterior stress applied. The patient demonstrates full strength in the supraspinatus, infraspinatus, and subscapularis. Neurologic and vascular examination of the upper extremity  is normal.    Left shoulder has no tenderness. He has pain with Blair and impingement maneuvers. Stressing the supra and infraspinatus both reproduce moderate discomfort. He has mild tightness in internal rotation. Neurologic and vascular exams are normal.  He has no instability. Xrays of the left shoulder were obtained today in the office and interpreted by me. AP in the scapular plane, axillary lateral, and scapular Y. These demonstrate: Mild degenerative change but no acute bony abnormalities. Assessment: Left shoulder rotator cuff tendinitis. Plan: At this point we will proceed with a cortisone injection followed by therapy. If he has ongoing symptoms in 4 to 6 weeks I recommend an MRI. He agrees. Procedure: Sterile technique, left shoulder was injected into the subacromial space with 80 mg of Depo-Medrol and 40 mg of lidocaine. He tolerated that well.   He will start therapy and check back with me in a month to 6 weeks

## 2022-03-24 ENCOUNTER — HOSPITAL ENCOUNTER (OUTPATIENT)
Dept: PHYSICAL THERAPY | Age: 67
Setting detail: THERAPIES SERIES
Discharge: HOME | End: 2022-03-24
Payer: MEDICARE

## 2022-03-24 PROCEDURE — 97110 THERAPEUTIC EXERCISES: CPT | Performed by: PHYSICAL THERAPIST

## 2022-03-24 PROCEDURE — 97161 PT EVAL LOW COMPLEX 20 MIN: CPT | Performed by: PHYSICAL THERAPIST

## 2022-03-24 PROCEDURE — 97112 NEUROMUSCULAR REEDUCATION: CPT | Performed by: PHYSICAL THERAPIST

## 2022-03-24 NOTE — PLAN OF CARE
[address], [address] [place] [address], [address] [place] [address]  Phone: [phone]   Fax: [phone]          Physical Therapy Certification    Dear Referring Practitioner: Yonatan Lauren MD,    We had the pleasure of evaluating the following patient for physical therapy services at [address] [place] [address]. A summary of our findings can be found in the initial assessment below. This includes our plan of care. If you have any questions or concerns regarding these findings, please do not hesitate to contact me at the office phone number checked above. Thank you for the referral.       Physician Signature:_______________________________Date:__________________  By signing above (or electronic signature), therapists plan is approved by physician      Patient: Loni Galvin   : 1955   MRN: [id number]  Referring Physician: Referring Practitioner: Yonatan Lauren MD      Evaluation Date: 3/24/2022      Medical Diagnosis Information:  Diagnosis: M75.82 (ICD-10-CM) - Rotator cuff tendinitis, left   Treatment Diagnosis: M25.512 (L) shoulder pain                                           Precautions/ Contra-indications:  lumbar discectomy in ; knee surgery     C-SSRS Triggered by Intake questionnaire (Past 2 wk assessment):   [x] No, Questionnaire did not trigger screening.   [] Yes, Patient intake triggered further evaluation      [] C-SSRS Screening completed  [] PCP notified via Plan of Care  [] Emergency services notified     Latex Allergy:  [x]NO      []YES  Preferred Language for Healthcare:   [x]English       []other:    SUBJECTIVE: Patient stated complaint: Pt presents for L shoulder pain. Constant dull ache but increases to intense pain when he raises his arm. No trauma. His pain started to get worse. He has had pain for at least a few months. Pt is L handed. Pt saw MD and was given a cortisone injection. This helped a lot. He was told to start PT. He reports he hasn't been able to throw a ball with L UE since his back surgery. He feels like he has gotten weak and more painful since then. X-ray was negative    Relevant Medical History: lumbar discectomy in 2010; knee surgery 2016  Functional Disability Index: UEFI -90%; FOTO- 61% functional  Relevant Medication:   none  Current pain: 2/10 supero- lateral shoulder    Pain at worst:  6/10 Before injection; 93/76 with certain movements  Pain at best:  0/10 since injections    Easing factors: injection, stretching behind back  Provocative factors: raising arm overhead, pushing down with arm to get out of bed, pushing himself up from chair, putting on jackets/coats/shirts    Type: [x]Constant   [x]Intermittent- increases  []Radiating [x]Localized []other:     Numbness/Tingling:none    Functional Limitations/Impairments: [x]Lifting/reaching []Grooming []Carrying    [x]ADL's []Driving [x]Sports/Recreations   []Other:    Occupation/School: - desk work; [employer]    Living Status/Prior Level of Function: Independent with ADLs and IADLs, without pain in L shoulder.        OBJECTIVE:     CERV ROM     Cervical Flexion WNL    Cervical Extension 75% limit but no pain    Cervical SB 75% limit but no pain    Cervical rotation 25% limit B but no pain        ROM PROM AROM  Comment    L R L R    Flexion   165- pressure 165    Abduction   175 tight 185    ER at side   70 65    ER at 90 abd   66 96    BB IR   T10 pressure T10    IR at 90 abd   61 65    Other        Other             Strength L R Comment   Flexion 4- pain 4+    Abduction 4 pain 4+    ER 4 pain 4+    IR 5 5    Supraspinatus      Upper Trap      Lower Trap      Mid Trap      Rhomboids      Biceps 5 5    Triceps 5 5    Horizontal Abduction      Horizontal Adduction      Lats        Orthopedic Special Tests:   Special Tests Left Right   Apley Scratch IR:  ER:   Cross body: IR:  ER:  Cross Body:   Neer's     Full Can     Empty Can Kirstin Etienne     Nerve Tension Testing     Speed's Mild pain No pain   Tirado's      Spurling's     Repeated Scaption     Drop Arm (supraspinatus)     ER lag sign (infraspinatus)     Belly Press (subscapularis     Lift off (subscapularis)     Apprehension test     ER internal impingement test                          Reflexes/Sensation (myotomes/dermatomes): n/t    Joint mobility: n/t   []Normal    []Hypo   []Hyper    Palpation: tender anterior shoulder at proximal biceps     Functional Mobility/Transfers: Indep    Posture: forward head and shoulders B    Bandages/Dressings/Incisions: n/a    Gait: (include devices/WB status) Indep              Review Of Systems (ROS):  [x]Performed Review of systems (Integumentary, CardioPulmonary, Neurological) by intake and observation. Intake form has been scanned into medical record. Patient has been instructed to contact their primary care physician regarding ROS issues if not already being addressed at this time.       Co-morbidities/Complexities (which will affect course of rehabilitation):   []None           Arthritic conditions   []Rheumatoid arthritis (M05.9)  []Osteoarthritis (M19.91)   Cardiovascular conditions   []Hypertension (I10)  []Hyperlipidemia (E78.5)  []Angina pectoris (I20)  []Atherosclerosis (I70)   Musculoskeletal conditions   []Disc pathology   []Congenital spine pathologies   [x]Prior surgical intervention- knee and back  []Osteoporosis (M81.8)  []Osteopenia (M85.8)   Endocrine conditions   []Hypothyroid (E03.9)  []Hyperthyroid Gastrointestinal conditions   []Constipation (I55.74)   Metabolic conditions   [x]Morbid obesity (E66.01)/overwieght  []Diabetes type 1(E10.65) or 2 (E11.65)   []Neuropathy (G60.9)     Pulmonary conditions   []Asthma (J45)  []Coughing   []COPD (J44.9)   Psychological Disorders  []Anxiety (F41.9)  []Depression (F32.9)   []Other:   []Other:          Barriers to/and or personal factors that will affect rehab potential: []Age  []Sex              []Motivation/Lack of Motivation                        [x]Co-Morbidities              []Cognitive Function, education/learning barriers              []Environmental, home barriers              []profession/work barriers  []past PT/medical experience  []other:  Justification:      Falls Risk Assessment (30 days):   [x] Falls Risk assessed and no intervention required. [] Falls Risk assessed and Patient requires intervention due to being higher risk   TUG score (>12s at risk):     [] Falls education provided, including         ASSESSMENT: Pt is a 74y.o. year old male with signs and symptoms consistent with L shoulder RC tendonitis. Pt presents with decreased strength; decreased ROM; increased pain; decreased flexibility; poor posture; and decreased functional mobility and ADLs. Pt will benefit from skilled physical therapy services to address above limitations through strengthening, stretching, ROM exercises, modalities as need for pain control, posture education, instruction on HEP, and education for return to functional mobility.      Functional Impairments   []Noted spinal or UE joint hypomobility   []Noted spinal or UE joint hypermobility   [x]Decreased UE functional ROM   [x]Decreased UE functional strength   []Abnormal reflexes/sensation/myotomal/dermatomal deficits   [x]Decreased RC/scapular/core strength and neuromuscular control   []other:      Functional Activity Limitations (from functional questionnaire and intake)   []Reduced ability to tolerate prolonged functional positions   [x]Reduced ability or difficulty with changes of positions or transfers between positions   [x]Reduced ability to maintain good posture and demonstrate good body mechanics with sitting, bending, and lifting   [] Reduced ability or tolerance with driving and/or computer work   []Reduced ability to sleep   [x]Reduced ability to perform lifting, reaching, carrying tasks   [x]Reduced ability to tolerate impact through UE   []Reduced ability to reach behind back   []Reduced ability to  or hold objects   [x]Reduced ability to throw or toss an object   []other:    Participation Restrictions   []Reduced participation in self care activities   [x]Reduced participation in home management activities   []Reduced participation in work activities   []Reduced participation in social activities. [x]Reduced participation in sport/recreation activities. Classification:   []Signs/symptoms consistent with post-surgical status including decreased ROM, strength and function. []Signs/symptoms consistent with joint sprain/strain   []Signs/symptoms consistent with shoulder impingement   [x]Signs/symptoms consistent with shoulder/elbow/wrist tendinopathy   []Signs/symptoms consistent with Rotator cuff tear   []Signs/symptoms consistent with labral tear   []Signs/symptoms consistent with postural dysfunction    []Signs/symptoms consistent with Glenohumeral IR Deficit - <45 degrees   []Signs/symptoms consistent with facet dysfunction of cervical/thoracic spine    []Signs/symptoms consistent with pathology which may benefit from Dry needling     []other:     Prognosis/Rehab Potential:      []Excellent   [x]Good    []Fair   []Poor    Tolerance of evaluation/treatment:    []Excellent   [x]Good    []Fair   []Poor    PLAN:  Frequency/Duration:  1 days per week for 4-6 Weeks:  INTERVENTIONS:  [x] Therapeutic exercise including: strength training, ROM, for Upper extremity and core   [x]  NMR activation and proprioception for UE, scap and Core   [x] Manual therapy as indicated for shoulder, scapula and spine to include: Dry Needling/IASTM, STM, PROM, Gr I-IV mobilizations, manipulation. [x] Modalities as needed that may include: thermal agents, E-stim, Biofeedback, US, iontophoresis as indicated  [x] Patient education on joint protection, postural re-education, activity modification, progression of HEP.     HEP instruction: Pt was instructed in, and safely and correctly demonstrated home exercise program. Patient verbalized understanding of proper frequency of exercises. Copy of exercises was scanned into patient chart and can be fount in the media file. Access Code: [email or web address]  URL: [email or web address]  Date: 03/24/2022  Prepared by: Lindy Montiel    Exercises  Supine Shoulder External Internal Rotation AAROM with Dowel - 2 x daily - 7 x weekly - 10 reps - 1 sets - 10 hold  Sleeper Stretch - 2 x daily - 7 x weekly - 10 reps - 1 sets - 10 hold  Standing Shoulder Internal Rotation Stretch with Towel - 2 x daily - 7 x weekly - 10 reps - 1 sets - 10 hold  Seated Scapular Retraction - 2 x daily - 7 x weekly - 10 reps - 1 sets - 10 hold  Scapular Retraction with Resistance - 1 x daily - 7 x weekly - 10 reps - 3 sets  Isometric Shoulder Flexion at Wall - 1 x daily - 7 x weekly - 10 reps - 1 sets - 10 hold  Isometric Shoulder Abduction at Wall - 1 x daily - 7 x weekly - 10 reps - 1 sets - 10 hold  Isometric Shoulder External Rotation at Wall - 1 x daily - 7 x weekly - 10 reps - 1 sets - 10 hold      GOALS:  Patient stated goal: reduce pain    Therapist goals for Patient:   Short Term Goals: To be achieved in: 2 weeks  1. Independent in HEP and progression per patient tolerance, in order to prevent re-injury. [] Progressing: [] Met: [] Not Met: [] Adjusted   2. Patient will have a decrease in pain to facilitate improvement in movement, function, and ADLs as indicated by Functional Deficits. [] Progressing: [] Met: [] Not Met: [] Adjusted    Long Term Goals: To be achieved in: 4-6 weeks  1. Disability index score of 95% or more for the UEFI or 73% functional in FOTO to assist with reaching prior level of function. [] Progressing: [] Met: [] Not Met: [] Adjusted  2. Patient will demonstrate increased L  shoulder AROM to 80 ER at 90 abd to allow for proper joint functioning as indicated by patients Functional Deficits.     [] Progressing: [] Met: [] Not Met: [] Adjusted  3. Patient will demonstrate an increase in L shoulder strength to 4+/5 flex, abd, ER in UE to allow for proper functional mobility as indicated by patients Functional Deficits. [] Progressing: [] Met: [] Not Met: [] Adjusted  4. Patient will return to pushing up with hands for supine to sit and sit to stand transfers without increased symptoms or restriction. [] Progressing: [] Met: [] Not Met: [] Adjusted  5. Pt will return to lifting light objects overhead for ADLs without pain in L shoulder. [] Progressing: [] Met: [] Not Met: [] Adjusted          Physical Therapy Evaluation Complexity Justification  [x] A history of present problem with:  [] no personal factors and/or comorbidities that impact the plan of care;  [x]1-2 personal factors and/or comorbidities that impact the plan of care  []3 personal factors and/or comorbidities that impact the plan of care  [x] An examination of body systems using standardized tests and measures addressing any of the following: body structures and functions (impairments), activity limitations, and/or participation restrictions;:  [] a total of 1-2 or more elements   [] a total of 3 or more elements   [x] a total of 4 or more elements   [x] A clinical presentation with:  [x] stable and/or uncomplicated characteristics   [] evolving clinical presentation with changing characteristics  [] unstable and unpredictable characteristics;   [x] Clinical decision making of [x] low, [] moderate, [] high complexity using standardized patient assessment instrument and/or measurable assessment of functional outcome.     [x] EVAL (LOW) 37859 (typically 20 minutes face-to-face)  [] EVAL (MOD) 12878 (typically 30 minutes face-to-face)  [] EVAL (HIGH) 63039 (typically 45 minutes face-to-face)  [] RE-EVAL 74784    Electronically signed by:  Lito Bullock, PT, PT, DPT

## 2022-03-24 NOTE — FLOWSHEET NOTE
[address] [facility], [place]                                                         Physical Therapy Daily Treatment Note  Date:  3/24/2022    Patient Name:  Vicky Dupree    :  1955  MRN: [id number]    Medical/Treatment Diagnosis Information:  · Diagnosis: M75.82 (ICD-10-CM) - Rotator cuff tendinitis, left  · Treatment Diagnosis: M25.512 (L) shoulder pain  Insurance/Certification information:  PT Insurance Information: [facility], [place], no auth, $40 copay  Physician Information:  Referring Practitioner: Jennifer Hobbs MD  Has the plan of care been signed (Y/N):        []  Yes  [x]  No     Date of Patient follow up with Physician: 3/25/22      Is this a Progress Report:     []  Yes  [x]  No        If Yes:  Date Range for reporting period:  Beginning- 3/24/2022   Ending    Progress report will be due (10 Rx or 30 days whichever is less): 10 visits or 3/38/75       Recertification will be due (POC Duration  / 90 days whichever is less): as above         Visit # Insurance Allowable Auth Required   1 [place] []  Yes [x]  No        Functional Scale:  UEFI- 90%; 61 functional     Date assessed:  3/24/2022     Latex Allergy:  [x]NO      []YES  Preferred Language for Healthcare:   [x]English       []other:      Pain level:  2-6/10  on 3/24/2022    SUBJECTIVE:  See eval    OBJECTIVE: See eval   Observation:   Test measurements:  CERV ROM       Cervical Flexion WNL     Cervical Extension 75% limit but no pain     Cervical SB 75% limit but no pain     Cervical rotation 25% limit B but no pain                   ROM PROM AROM  Comment     L R L R     Flexion     165- pressure 165     Abduction     175 tight 185     ER at side     70 65     ER at 90 abd     66 96     BB IR     T10 pressure T10     IR at 90 abd     61 65     Other             Other                    Strength L R Comment   Flexion 4- pain 4+     Abduction 4 pain 4+   prone     Prone ER at 90 abd     Serratus     Horizontal Abd with ER     Biceps     Triceps     Retraction     External rotation at 90 abd (hitch hiker)     Cable Column/Theraband     External Rotation     Internal Rotation     Shrugs     Lats     Ext     Flex     Scapular Retraction 2   X 10 blue, hold 2 secs TB    BIC     TRIC     PNF          Dynamic Stability     Ball on wall     Push ups on wall     Push ups on ball on wall     90/90 ball taps     Body blade     Horizontal abd ball taps     Plyoback            Patient education: Pt was educated on PT diagnosis, prognosis, and plan of care. Reviewed insurance benefits for physical therapy in an outpatient hospital based setting with the patient, including copay of $40 and allowable visit number. Pt was informed of possible out of pocket costs      Therapeutic Exercise and NMR EXR  [x] (70125) Provided verbal/tactile cueing for activities related to strengthening, flexibility, endurance, ROM  for improvements in scapular, scapulothoracic and UE control with self care, reaching, carrying, lifting, house/yardwork, driving/computer work.    [] (49815) Provided verbal/tactile cueing for activities related to improving balance, coordination, kinesthetic sense, posture, motor skill, proprioception  to assist with  scapular, scapulothoracic and UE control with self care, reaching, carrying, lifting, house/yardwork, driving/computer work. Therapeutic Activities:    [x] (44514 or 26086) Provided verbal/tactile cueing for activities related to improving balance, coordination, kinesthetic sense, posture, motor skill, proprioception and motor activation to allow for proper function of scapular, scapulothoracic and UE control with self care, carrying, lifting, driving/computer work.      Home Exercise Program:    [x] (48457) Reviewed/Progressed HEP activities related to strengthening, flexibility, endurance, ROM of scapular, scapulothoracic and UE control with self care, reaching, carrying, lifting, house/yardwork, driving/computer work  [] (71401) Reviewed/Progressed HEP activities related to improving balance, coordination, kinesthetic sense, posture, motor skill, proprioception of scapular, scapulothoracic and UE control with self care, reaching, carrying, lifting, house/yardwork, driving/computer work    Access Code: [email or web address]  URL: [email or web address]  Date: 03/24/2022  Prepared by: Micheal Cool     Exercises  Supine Shoulder External Internal Rotation AAROM with Dowel - 2 x daily - 7 x weekly - 10 reps - 1 sets - 10 hold  Sleeper Stretch - 2 x daily - 7 x weekly - 10 reps - 1 sets - 10 hold  Standing Shoulder Internal Rotation Stretch with Towel - 2 x daily - 7 x weekly - 10 reps - 1 sets - 10 hold  Seated Scapular Retraction - 2 x daily - 7 x weekly - 10 reps - 1 sets - 10 hold  Scapular Retraction with Resistance - 1 x daily - 7 x weekly - 10 reps - 3 sets  Isometric Shoulder Flexion at Wall - 1 x daily - 7 x weekly - 10 reps - 1 sets - 10 hold  Isometric Shoulder Abduction at Wall - 1 x daily - 7 x weekly - 10 reps - 1 sets - 10 hold  Isometric Shoulder External Rotation at Wall - 1 x daily - 7 x weekly - 10 reps - 1 sets - 10 hold     Manual Treatments:  PROM / STM / Oscillations-Mobs:  G-I, II, III, IV (PA's, Inf., Post.)  [] (81591) Provided manual therapy to mobilize soft tissue/joints of cervical/CT, scapular GHJ and UE for the purpose of modulating pain, promoting relaxation,  increasing ROM, reducing/eliminating soft tissue swelling/inflammation/restriction, improving soft tissue extensibility and allowing for proper ROM for normal function with self care, reaching, carrying, lifting, house/yardwork, driving/computer work    Modalities:      Charges:  Timed Code Treatment Minutes: 26   Total Treatment Minutes: 55     [x] EVAL (LOW) 92855   [] EVAL (MOD) 45517   [] EVAL (HIGH) 12992   [] RE-EVAL   [x] TF(61222) x1     [] IONTO  [x] NMR (86368) x  1   [] VASO  [] Manual (06070) x      [] Other:  [] TA x      [] Mech Traction (20020)  [] ES(attended) (57628)      [] ES (un) (22743):       Vanessa Vásquez stated goal: reduce pain     Therapist goals for Patient:   Short Term Goals: To be achieved in: 2 weeks  1. Independent in HEP and progression per patient tolerance, in order to prevent re-injury. []? Progressing: []? Met: []? Not Met: []? Adjusted   2. Patient will have a decrease in pain to facilitate improvement in movement, function, and ADLs as indicated by Functional Deficits. []? Progressing: []? Met: []? Not Met: []? Adjusted     Long Term Goals: To be achieved in: 4-6 weeks  1. Disability index score of 95% or more for the UEFI to assist with reaching prior level of function. []? Progressing: []? Met: []? Not Met: []? Adjusted  2. Patient will demonstrate increased L  shoulder AROM to 80 ER at 90 abd to allow for proper joint functioning as indicated by patients Functional Deficits. []? Progressing: []? Met: []? Not Met: []? Adjusted  3. Patient will demonstrate an increase in L shoulder strength to 4+/5 flex, abd, ER in UE to allow for proper functional mobility as indicated by patients Functional Deficits. []? Progressing: []? Met: []? Not Met: []? Adjusted  4. Patient will return to pushing up with hands for supine to sit and sit to stand transfers without increased symptoms or restriction. []? Progressing: []? Met: []? Not Met: []? Adjusted  5. Pt will return to lifting light objects overhead for ADLs without pain in L shoulder. []? Progressing: []? Met: []? Not Met: []? Adjusted         Overall Progression Towards Functional goals/ Treatment Progress Update:  [] Patient is progressing as expected towards functional goals listed. [] Progression is slowed due to complexities/Impairments listed. [] Progression has been slowed due to co-morbidities.   [x] Plan just implemented, too soon to assess goals progression <30days   [] Goals require adjustment due to lack of progress  [] Patient is not progressing as expected and requires additional follow up with physician  [] Other    Prognosis for POC: [x] Good [] Fair  [] Poor      Patient requires continued skilled intervention: [x] Yes  [] No      ASSESSMENT:  See eval    Treatment/Activity Tolerance:  [x] Patient tolerated treatment well [] Patient limited by fatique  [] Patient limited by pain  [] Patient limited by other medical complications  [] Other:       Return to Play: (if applicable)   []  Stage 1: Intro to Strength   []  Stage 2: Return to Run and Strength   []  Stage 3: Return to Jump and Strength   []  Stage 4: Dynamic Strength and Agility   []  Stage 5: Sport Specific Training     []  Ready to Return to Play, Meets All Above Stages   []  Not Ready for Return to Sports   Comments:            Prognosis: [x] Good [] Fair  [] Poor    Patient Requires Follow-up: [x] Yes  [] No    PLAN: See eval; consider SL ER, serratus, pec stretch,   [] Continue per plan of care [] Alter current plan (see comments above)  [x] Plan of care initiated [] Hold pending MD visit [] Discharge    Note: If patient does not return for scheduled/ recommended follow up visits, this note will serve as a discharge from care along with most recent update on progress.      Electronically signed by: Bernardo Medrano, PT PT, DPT

## 2022-03-25 ENCOUNTER — OFFICE VISIT (OUTPATIENT)
Dept: ORTHOPEDIC SURGERY | Age: 67
End: 2022-03-25
Payer: MEDICARE

## 2022-03-25 VITALS — WEIGHT: 240 LBS | BODY MASS INDEX: 32.51 KG/M2 | HEIGHT: 72 IN

## 2022-03-25 DIAGNOSIS — M25.512 ACUTE PAIN OF LEFT SHOULDER: Primary | ICD-10-CM

## 2022-03-25 DIAGNOSIS — M75.82 ROTATOR CUFF TENDINITIS, LEFT: ICD-10-CM

## 2022-03-25 PROCEDURE — 99213 OFFICE O/P EST LOW 20 MIN: CPT | Performed by: ORTHOPAEDIC SURGERY

## 2022-03-25 NOTE — PROGRESS NOTES
Felipa Gallegos is today for his left shoulder feeling dramatically better. He has pain is about 3 out of 10 but he feels much improved. His second complaint is some right knee pain has been bothersome for less than a week. He only notices it when he kneels down. General Exam:    Vitals: Height 6' (1.829 m), weight 240 lb (108.9 kg). Constitutional: Patient is adequately groomed with no evidence of malnutrition  Mental Status: The patient is oriented to time, place and person. The patient's mood and affect are appropriate. Left shoulder today has mild discomfort stressing the supraspinatus but good strength and no pain with Blair or impingement maneuvers. He has no tenderness. Neurologic and vascular exams are normal.    Right knee has some very mild discomfort over the patella tendon. He has no intra-articular effusion or joint line tenderness. I reviewed his records which demonstrate that we did right knee arthroscopy in November 2012. At that time he had high-grade patellofemoral chondromalacia. Assessment: Improving left shoulder rotator cuff tendinitis and short-term pain related to right knee arthritis. Plan: He will continue with his therapy for his shoulder. If he has persistent right knee pain in a month I would recommend reevaluation but in the short-term recommend he take Aleve 1 pill twice a day for at least the next week. He agrees.

## 2022-04-04 ENCOUNTER — HOSPITAL ENCOUNTER (OUTPATIENT)
Dept: PHYSICAL THERAPY | Age: 67
Setting detail: THERAPIES SERIES
Discharge: HOME | End: 2022-04-04
Payer: MEDICARE

## 2022-04-04 PROCEDURE — 97112 NEUROMUSCULAR REEDUCATION: CPT | Performed by: PHYSICAL THERAPIST

## 2022-04-04 PROCEDURE — 97140 MANUAL THERAPY 1/> REGIONS: CPT | Performed by: PHYSICAL THERAPIST

## 2022-04-04 PROCEDURE — 97110 THERAPEUTIC EXERCISES: CPT | Performed by: PHYSICAL THERAPIST

## 2022-04-04 NOTE — FLOWSHEET NOTE
[address] [place] [address] [facility], [place]                                                         Physical Therapy Daily Treatment Note  Date:  2022    Patient Name:  Moises Hannah    :  1955  MRN: [id number]    Medical/Treatment Diagnosis Information:  · Diagnosis: M75.82 (ICD-10-CM) - Rotator cuff tendinitis, left  · Treatment Diagnosis: M25.512 (L) shoulder pain  Insurance/Certification information:  PT Insurance Information: [employer], no auth, $40 copay  Physician Information:  Referring Practitioner: Hilda Yeager MD  Has the plan of care been signed (Y/N):        [x]  Yes  []  No     Date of Patient follow up with Physician: 3/25/22      Is this a Progress Report:     []  Yes  [x]  No        If Yes:  Date Range for reporting period:  Beginning- 3/24/2022   Ending    Progress report will be due (10 Rx or 30 days whichever is less): 10 visits or        Recertification will be due (POC Duration  / 90 days whichever is less): as above         Visit # Insurance Allowable Auth Required   2 MN []  Yes [x]  No        Functional Scale: UEFI- 90%; 61 functional     Date assessed:  3/24/2022     Latex Allergy:  [x]NO      []YES  Preferred Language for Healthcare:   [x]English       []other:      Pain level:  0/10  on 2022  SUBJECTIVE: pt says shoulder is feeling good. Otherwise he does notice some clicking/catching in shoulder. He says exercises going well except for ones he has to lay on floor for he does not do.  He tried them in bed    OBJECTIVE: See eval   Observation:   Test measurements:  CERV ROM       Cervical Flexion WNL     Cervical Extension 75% limit but no pain     Cervical SB 75% limit but no pain     Cervical rotation 25% limit B but no pain                   ROM PROM AROM  Comment     L R L R     Flexion     165- pressure 165     Abduction     175 tight 185     ER at side     70 65     ER at 90 abd     66 96     BB IR     T10 pressure T10     IR at 90 abd     61 65     Other             Other                    Strength L R Comment   Flexion 4- pain 4+     Abduction 4 pain 4+     ER 4 pain 4+     IR 5 5     Supraspinatus         Upper Trap         Lower Trap         Mid Trap         Rhomboids         Biceps 5 5     Triceps 5 5     Horizontal Abduction         Horizontal Adduction         Lats            Orthopedic Special Tests:   Special Tests Left Right   Apley Scratch IR:  ER:   Cross body: IR:  ER:  Cross Body:   Neer's       Full Can       Empty Can       [place]       Nerve Tension Testing       Speed's Mild pain No pain   Tirado's        Spurling's       Repeated Scaption       Drop Arm (supraspinatus)       ER lag sign (infraspinatus)       Belly Press (subscapularis       Lift off (subscapularis)       Apprehension test       ER internal impingement test                                        Reflexes/Sensation (myotomes/dermatomes): n/t     Joint mobility: n/t              []?Normal                       []?Hypo              []?Hyper     Palpation: tender anterior shoulder at proximal biceps      Functional Mobility/Transfers: Indep     Posture: forward head and shoulders B     Bandages/Dressings/Incisions: n/a     Gait: (include devices/WB status) Indep            RESTRICTIONS/PRECAUTIONS: lumbar discectomy in 2010; knee surgery 2016    Exercises/Interventions:     Exercise/Equipment Resistance/Repetitions Other comments   Aerobic Conditioning     Aerodyne          Stretching/PROM     ER stretch supine     ER stretch seated     Table Slides     Wall slides      UE [place]     Pulleys     Pendulum     BB IR 10 x 10 secs    SL IR 10 x 10 secs- sleeper on wall    Pec doorway stretch 5 x 10 secs    CBA stretch     UT stretch     LS stretch     Isometrics     Retraction          Weight shift     Flexion    Abduction    External Rotation    Internal Rotation     Biceps     Triceps PRE's     Scaption     Abduction     External Rotation- SL     Internal Rotation     Shrugs     EXT- prone     Reverse Flys- prone     Y's - prone     Prone ER at 90 abd     Serratus 3 x 10     Horizontal Abd with ER     Biceps     Triceps     Retraction     External rotation at 90 abd (hitch hiker)     Cable Column/Theraband     External Rotation 3 x 10 red TB    Internal Rotation     Shrugs     Lats     Ext 3 x 10 red TB    Flex     Scapular Retraction 3   X 10 black, hold 2 secs TB    BIC     TRIC     PNF          Dynamic Stability     Ball on wall X 20 CW/CCW    Push ups on wall     Push ups on ball on wall     90/90 ball taps     Body blade     Horizontal abd ball taps     Plyoback          Manual tx     STM  To L UT, supraspinatus, infraspinatus, and teres minor x 8 mins pt            Patient education: Pt was educated on PT diagnosis, prognosis, and plan of care. Reviewed insurance benefits for physical therapy in an outpatient hospital based setting with the patient, including copay of $40 and allowable visit number. Pt was informed of possible out of pocket costs      Therapeutic Exercise and NMR EXR  [x] (61020) Provided verbal/tactile cueing for activities related to strengthening, flexibility, endurance, ROM  for improvements in scapular, scapulothoracic and UE control with self care, reaching, carrying, lifting, house/yardwork, driving/computer work.    [] (26740) Provided verbal/tactile cueing for activities related to improving balance, coordination, kinesthetic sense, posture, motor skill, proprioception  to assist with  scapular, scapulothoracic and UE control with self care, reaching, carrying, lifting, house/yardwork, driving/computer work.     Therapeutic Activities:    [x] (58138 or 81920) Provided verbal/tactile cueing for activities related to improving balance, coordination, kinesthetic sense, posture, motor skill, proprioception and motor activation to allow for proper function of scapular, scapulothoracic and UE control with self care, carrying, lifting, driving/computer work. Home Exercise Program:    [x] (30739) Reviewed/Progressed HEP activities related to strengthening, flexibility, endurance, ROM of scapular, scapulothoracic and UE control with self care, reaching, carrying, lifting, house/yardwork, driving/computer work  [] (06491) Reviewed/Progressed HEP activities related to improving balance, coordination, kinesthetic sense, posture, motor skill, proprioception of scapular, scapulothoracic and UE control with self care, reaching, carrying, lifting, house/yardwork, driving/computer work    Access Code: [email or web address]  URL: [email or web address]  Date: 04/04/2022  Prepared by: Fide Scott    Exercises  Doorway Pec Stretch at 90 Degrees Abduction - 1 x daily - 7 x weekly - 5 sets - 1 reps - 20-30 hold  Supine Shoulder External Internal Rotation AAROM with Dowel - 2 x daily - 7 x weekly - 10 reps - 1 sets - 10 hold  Sleeper Stretch - 2 x daily - 7 x weekly - 10 reps - 1 sets - 10 hold  Standing Sleeper Stretch at Janit Muss - 1 x daily - 7 x weekly - 3 sets - 10 reps  Standing Shoulder Internal Rotation Stretch with Towel - 2 x daily - 7 x weekly - 10 reps - 1 sets - 10 hold  Supine Scapular Protraction in Flexion with Dumbbells - 1 x daily - 7 x weekly - 3 sets - 10 reps  Seated Scapular Retraction - 2 x daily - 7 x weekly - 10 reps - 1 sets - 10 hold  Shoulder External Rotation with Anchored Resistance - 1 x daily - 7 x weekly - 3 sets - 10 reps  Scapular Retraction with Resistance - 1 x daily - 7 x weekly - 10 reps - 3 sets  Shoulder extension with resistance - Neutral - 1 x daily - 7 x weekly - 3 sets - 10 reps       Manual Treatments:  PROM / STM / Oscillations-Mobs:  G-I, II, III, IV (PA's, Inf., Post.)  [x] (68085) Provided manual therapy to mobilize soft tissue/joints of cervical/CT, scapular GHJ and UE for the purpose of modulating pain, promoting relaxation,  increasing ROM, reducing/eliminating soft tissue swelling/inflammation/restriction, improving soft tissue extensibility and allowing for proper ROM for normal function with self care, reaching, carrying, lifting, house/yardwork, driving/computer work    Modalities:      Charges:  Timed Code Treatment Minutes: 39   Total Treatment Minutes: 39     [] EVAL (LOW) 44603   [] EVAL (MOD) 55280   [] EVAL (HIGH) 75671   [] RE-EVAL   [x] DP(94386) x1     [] IONTO  [x] NMR (96127) x  1   [] VASO  [x] Manual (17459) x1      [] Other:  [] TA x      [] Mech Traction (61535)  [] ES(attended) (64561)      [] ES (un) (71182):       Pierre Jordan stated goal: reduce pain     Therapist goals for Patient:   Short Term Goals: To be achieved in: 2 weeks  1. Independent in HEP and progression per patient tolerance, in order to prevent re-injury. []? Progressing: []? Met: []? Not Met: []? Adjusted   2. Patient will have a decrease in pain to facilitate improvement in movement, function, and ADLs as indicated by Functional Deficits. []? Progressing: []? Met: []? Not Met: []? Adjusted     Long Term Goals: To be achieved in: 4-6 weeks  1. Disability index score of 95% or more for the UEFI to assist with reaching prior level of function. []? Progressing: []? Met: []? Not Met: []? Adjusted  2. Patient will demonstrate increased L  shoulder AROM to 80 ER at 90 abd to allow for proper joint functioning as indicated by patients Functional Deficits. []? Progressing: []? Met: []? Not Met: []? Adjusted  3. Patient will demonstrate an increase in L shoulder strength to 4+/5 flex, abd, ER in UE to allow for proper functional mobility as indicated by patients Functional Deficits. []? Progressing: []? Met: []? Not Met: []? Adjusted  4. Patient will return to pushing up with hands for supine to sit and sit to stand transfers without increased symptoms or restriction. []? Progressing: []? Met: []? Not Met: []? Adjusted  5.  Pt will return to lifting light objects overhead for ADLs without pain in L shoulder. []? Progressing: []? Met: []? Not Met: []? Adjusted         Overall Progression Towards Functional goals/ Treatment Progress Update:  [] Patient is progressing as expected towards functional goals listed. [] Progression is slowed due to complexities/Impairments listed. [] Progression has been slowed due to co-morbidities. [x] Plan just implemented, too soon to assess goals progression <30days   [] Goals require adjustment due to lack of progress  [] Patient is not progressing as expected and requires additional follow up with physician  [] Other    Prognosis for POC: [x] Good [] Fair  [] Poor      Patient requires continued skilled intervention: [x] Yes  [] No      ASSESSMENT:  See eval    Treatment/Activity Tolerance:  [x] Patient tolerated treatment well [] Patient limited by fatique  [] Patient limited by pain  [] Patient limited by other medical complications  [x] Other: pt does not like to lay down to do exercises so did do more stretches standing to show him how to perform similar stretches standing instead. Pt was doing well with isometrics at home so did advance exercises in session today. He did get fatigued with TB ER and did get sore on ball on wall so only did 20 reps of these each way. Did add in STM at end of session to help decrease pain and tightness. He was tight in his UT.        Return to Play: (if applicable)   []  Stage 1: Intro to Strength   []  Stage 2: Return to Run and Strength   []  Stage 3: Return to Jump and Strength   []  Stage 4: Dynamic Strength and Agility   []  Stage 5: Sport Specific Training     []  Ready to Return to Play, Meets All Above Stages   []  Not Ready for Return to Sports   Comments:            Prognosis: [x] Good [] Fair  [] Poor    Patient Requires Follow-up: [x] Yes  [] No    PLAN: See eval; consider  ER at 90 abd, H abd with TB  [x] Continue per plan of care [] Alter current plan (see comments above)  [] Plan of care initiated [] Hold pending MD visit [] Discharge    Note: If patient does not return for scheduled/ recommended follow up visits, this note will serve as a discharge from care along with most recent update on progress.      Electronically signed by: Becky Dee, PT PT, DPT

## 2022-04-12 ENCOUNTER — HOSPITAL ENCOUNTER (OUTPATIENT)
Dept: PHYSICAL THERAPY | Age: 67
Setting detail: THERAPIES SERIES
Discharge: HOME | End: 2022-04-12
Payer: MEDICARE

## 2022-04-12 PROCEDURE — 97110 THERAPEUTIC EXERCISES: CPT | Performed by: PHYSICAL THERAPIST

## 2022-04-12 PROCEDURE — 97140 MANUAL THERAPY 1/> REGIONS: CPT | Performed by: PHYSICAL THERAPIST

## 2022-04-12 PROCEDURE — 97112 NEUROMUSCULAR REEDUCATION: CPT | Performed by: PHYSICAL THERAPIST

## 2022-04-12 NOTE — FLOWSHEET NOTE
[facility] [facility]  Phone: [phone]   Fax: [phone]                                                             Physical Therapy Daily Treatment Note  Date:  2022    Patient Name:  Liana Carney    :  1955  MRN: [id number]    Medical/Treatment Diagnosis Information:  · Diagnosis: M75.82 (ICD-10-CM) - Rotator cuff tendinitis, left  · Treatment Diagnosis: M25.512 (L) shoulder pain  Insurance/Certification information:  PT Insurance Information: ALE Gonzalez, no auth, $40 copay  Physician Information:  Referring Practitioner: Reece Gilmore MD  Has the plan of care been signed (Y/N):        [x]  Yes  []  No     Date of Patient follow up with Physician: 3/25/22      Is this a Progress Report:     []  Yes  [x]  No        If Yes:  Date Range for reporting period:  Beginning- 3/24/2022   Ending    Progress report will be due (10 Rx or 30 days whichever is less): 10 visits or        Recertification will be due (POC Duration  / 90 days whichever is less): as above         Visit # Insurance Allowable Auth Required   3 MN []  Yes [x]  No        Functional Scale: UEFI- 90%; 61 functional     Date assessed:  3/24/2022     Latex Allergy:  [x]NO      []YES  Preferred Language for Healthcare:   [x]English       []other:      Pain level:  2-3/10  on 2022  SUBJECTIVE: pt states shoulder is sore. He played golf on . Exercises are going well. He may be sore in shoulder from the golfing. He did try to lift his grand-daughter before this too and felt like he did not have a lot of strength in shoulder.      OBJECTIVE: See eval   Observation:   Test measurements:  CERV ROM       Cervical Flexion WNL     Cervical Extension 75% limit but no pain     Cervical SB 75% limit but no pain     Cervical rotation 25% limit B but no pain                   ROM PROM AROM  Comment     L R L R     Flexion     165- pressure 165     Abduction     175 tight 185     ER at side     70 65     ER at 90 abd     66 96     BB IR     T10 pressure T10     IR at 90 abd     61 65     Other             Other                    Strength L R Comment   Flexion 4- pain 4+     Abduction 4 pain 4+     ER 4 pain 4+     IR 5 5     Supraspinatus         Upper Trap         Lower Trap         Mid Trap         Rhomboids         Biceps 5 5     Triceps 5 5     Horizontal Abduction         Horizontal Adduction         Lats            Orthopedic Special Tests:   Special Tests Left Right   Apley Scratch IR:  ER:   Cross body: IR:  ER:  Cross Body:   Neer's       Full Can       Empty Can       Barron Akers       Nerve Tension Testing       Speed's Mild pain No pain   Tirado's        Spurling's       Repeated Scaption       Drop Arm (supraspinatus)       ER lag sign (infraspinatus)       Belly Press (subscapularis       Lift off (subscapularis)       Apprehension test       ER internal impingement test                                        Reflexes/Sensation (myotomes/dermatomes): n/t     Joint mobility: n/t              []?Normal                       []?Hypo              []?Hyper     Palpation: tender anterior shoulder at proximal biceps      Functional Mobility/Transfers: Indep     Posture: forward head and shoulders B     Bandages/Dressings/Incisions: n/a     Gait: (include devices/WB status) Indep            RESTRICTIONS/PRECAUTIONS: lumbar discectomy in 2010; knee surgery 2016    Exercises/Interventions:     Exercise/Equipment Resistance/Repetitions Other comments   Aerobic Conditioning     Aerodyne          Stretching/PROM     ER stretch supine At 90 abd 10 x 10 secs    ER stretch seated     Table Slides     Wall slides      UE [place]     Pulleys     Pendulum     BB IR 10 x 10 secs    SL IR 10 x 10 secs- sleeper on wall    Pec doorway stretch 5 x 10 secs    CBA stretch     UT stretch     LS stretch Isometrics     Retraction          Weight shift     Flexion    Abduction    External Rotation    Internal Rotation     Biceps     Triceps          PRE's     Scaption     Abduction     External Rotation- SL     Internal Rotation     Shrugs     EXT- prone     Reverse Flys- prone     Y's - prone     Prone ER at 90 abd     Serratus 3 x 10 5 lbs    Horizontal Abd with ER     Biceps     Triceps     Retraction     External rotation at 90 abd (hitch hiker) 2 x 10 4 lbs    Cable Column/Theraband     External Rotation 2 x 10 yellow TB    Internal Rotation     Shrugs     Lats     Ext 3 x 10 green TB    Flex     Scapular Retraction 3   X 10 orange, hold 2 secs TB    BIC     TRIC     PNF     Horizontal abd at 90 abd     Dynamic Stability     Ball on wall X 30 CW/CCW D/c npv due to pain 4/12   Push ups on wall     Push ups on ball on wall     90/90 ball taps     Body blade     Horizontal abd ball taps     Plyoback          Manual tx     PROM L shoulder 5'    STM  To L UT, supraspinatus, infraspinatus, and teres minor x 4' pt supine            Patient education: Pt was educated on PT diagnosis, prognosis, and plan of care. Reviewed insurance benefits for physical therapy in an outpatient hospital based setting with the patient, including copay of $40 and allowable visit number.  Pt was informed of possible out of pocket costs      Therapeutic Exercise and NMR EXR  [x] (41073) Provided verbal/tactile cueing for activities related to strengthening, flexibility, endurance, ROM  for improvements in scapular, scapulothoracic and UE control with self care, reaching, carrying, lifting, house/yardwork, driving/computer work.    [] (61092) Provided verbal/tactile cueing for activities related to improving balance, coordination, kinesthetic sense, posture, motor skill, proprioception  to assist with  scapular, scapulothoracic and UE control with self care, reaching, carrying, lifting, house/yardwork, driving/computer work.    Therapeutic Activities:    [x] (25056 or 15693) Provided verbal/tactile cueing for activities related to improving balance, coordination, kinesthetic sense, posture, motor skill, proprioception and motor activation to allow for proper function of scapular, scapulothoracic and UE control with self care, carrying, lifting, driving/computer work. Home Exercise Program:    [x] (52576) Reviewed/Progressed HEP activities related to strengthening, flexibility, endurance, ROM of scapular, scapulothoracic and UE control with self care, reaching, carrying, lifting, house/yardwork, driving/computer work  [] (07529) Reviewed/Progressed HEP activities related to improving balance, coordination, kinesthetic sense, posture, motor skill, proprioception of scapular, scapulothoracic and UE control with self care, reaching, carrying, lifting, house/yardwork, driving/computer work    Access Code: [email or web address]  URL: [employer]/  Date: 04/12/2022  Prepared by: Mario Alberto Calderón  Doorway Pec Stretch at 90 Degrees Abduction - 1 x daily - 7 x weekly - 5 sets - 1 reps - 20-30 hold  Supine Shoulder External Internal Rotation AAROM with Dowel - 2 x daily - 7 x weekly - 10 reps - 1 sets - 10 hold  Sleeper Stretch - 2 x daily - 7 x weekly - 10 reps - 1 sets - 10 hold  Standing Sleeper Stretch at [place] - 1 x daily - 7 x weekly - 3 sets - 10 reps  Standing Shoulder Internal Rotation Stretch with Towel - 2 x daily - 7 x weekly - 10 reps - 1 sets - 10 hold  Supine Scapular Protraction in Flexion with Dumbbells - 1 x daily - 7 x weekly - 3 sets - 10 reps  Seated Shoulder External Rotation in Abduction Supported with Dumbbell - 1 x daily - 7 x weekly - 3 sets - 10 reps  Seated Scapular Retraction - 2 x daily - 7 x weekly - 10 reps - 1 sets - 10 hold  Shoulder External Rotation with Anchored Resistance - 1 x daily - 7 x weekly - 3 sets - 10 reps  Scapular Retraction with Resistance - 1 x daily - 7 x weekly - 10 reps - 3 sets  Shoulder extension with resistance - Neutral - 1 x daily - 7 x weekly - 3 sets - 10 reps       Manual Treatments:  PROM / STM / Oscillations-Mobs:  G-I, II, III, IV (PA's, Inf., Post.)  [x] (89926) Provided manual therapy to mobilize soft tissue/joints of cervical/CT, scapular GHJ and UE for the purpose of modulating pain, promoting relaxation,  increasing ROM, reducing/eliminating soft tissue swelling/inflammation/restriction, improving soft tissue extensibility and allowing for proper ROM for normal function with self care, reaching, carrying, lifting, house/yardwork, driving/computer work    Modalities:      Charges:  Timed Code Treatment Minutes: 44   Total Treatment Minutes: 49     [] EVAL (LOW) 01412   [] EVAL (MOD) 82962   [] EVAL (HIGH) 39428   [] RE-EVAL   [x] QH(60997) x1     [] IONTO  [x] NMR (69207) x  1   [] VASO  [x] Manual (67775) x1      [] Other:  [] TA x      [] Mech Traction (24733)  [] ES(attended) (92452)      [] ES (un) (51315):       Kristine Nest stated goal: reduce pain     Therapist goals for Patient:   Short Term Goals: To be achieved in: 2 weeks  1. Independent in HEP and progression per patient tolerance, in order to prevent re-injury. []? Progressing: []? Met: []? Not Met: []? Adjusted   2. Patient will have a decrease in pain to facilitate improvement in movement, function, and ADLs as indicated by Functional Deficits. []? Progressing: []? Met: []? Not Met: []? Adjusted     Long Term Goals: To be achieved in: 4-6 weeks  1. Disability index score of 95% or more for the UEFI to assist with reaching prior level of function. []? Progressing: []? Met: []? Not Met: []? Adjusted  2. Patient will demonstrate increased L  shoulder AROM to 80 ER at 90 abd to allow for proper joint functioning as indicated by patients Functional Deficits. []? Progressing: []? Met: []? Not Met: []? Adjusted  3.  Patient will demonstrate an increase in L shoulder strength to 4+/5 flex, abd, ER in UE to allow for proper functional mobility as indicated by patients Functional Deficits. []? Progressing: []? Met: []? Not Met: []? Adjusted  4. Patient will return to pushing up with hands for supine to sit and sit to stand transfers without increased symptoms or restriction. []? Progressing: []? Met: []? Not Met: []? Adjusted  5. Pt will return to lifting light objects overhead for ADLs without pain in L shoulder. []? Progressing: []? Met: []? Not Met: []? Adjusted         Overall Progression Towards Functional goals/ Treatment Progress Update:  [] Patient is progressing as expected towards functional goals listed. [] Progression is slowed due to complexities/Impairments listed. [] Progression has been slowed due to co-morbidities. [x] Plan just implemented, too soon to assess goals progression <30days   [] Goals require adjustment due to lack of progress  [] Patient is not progressing as expected and requires additional follow up with physician  [] Other    Prognosis for POC: [x] Good [] Fair  [] Poor      Patient requires continued skilled intervention: [x] Yes  [] No      ASSESSMENT:  See eval    Treatment/Activity Tolerance:  [x] Patient tolerated treatment well [] Patient limited by fatique  [] Patient limited by pain  [] Patient limited by other medical complications  [x] Other: Pt was tight in ER with pec stretch so did have pt perform supine cane ER also in session. It did loosen up as he went and was able to get full ROM with manual PROM. He was encouraged to continue to work on this supine at home. He was tight and tender in distal infraspinatus area with STM. He did need cues to improve form on several exercises throughout session. He was educated on doing strengthening only every other day to avoid overdoing it but to stretch daily. He did get sore with ball on wall so only did one set of these.      Return to Play: (if applicable)   []  Stage 1: Intro to Strength   []  Stage 2: Return to Run and Strength   []  Stage 3: Return to Jump and Strength   []  Stage 4: Dynamic Strength and Agility   []  Stage 5: Sport Specific Training     []  Ready to Return to Play, Meets All Above Stages   []  Not Ready for Return to Sports   Comments:            Prognosis: [x] Good [] Fair  [] Poor    Patient Requires Follow-up: [x] Yes  [] No    PLAN: See eval; consider H abd with TB  [x] Continue per plan of care [] Alter current plan (see comments above)  [] Plan of care initiated [] Hold pending MD visit [] Discharge    Note: If patient does not return for scheduled/ recommended follow up visits, this note will serve as a discharge from care along with most recent update on progress.      Electronically signed by: Madi Rich, PT PT, DPT

## 2022-04-26 ENCOUNTER — HOSPITAL ENCOUNTER (OUTPATIENT)
Dept: PHYSICAL THERAPY | Age: 67
Setting detail: THERAPIES SERIES
Discharge: HOME | End: 2022-04-26
Payer: MEDICARE

## 2022-04-26 PROCEDURE — 97112 NEUROMUSCULAR REEDUCATION: CPT | Performed by: PHYSICAL THERAPIST

## 2022-04-26 PROCEDURE — 97530 THERAPEUTIC ACTIVITIES: CPT | Performed by: PHYSICAL THERAPIST

## 2022-04-26 PROCEDURE — 97110 THERAPEUTIC EXERCISES: CPT | Performed by: PHYSICAL THERAPIST

## 2022-04-26 NOTE — PLAN OF CARE
[facility], [facility] [address], [facility]  Phone: [phone]   Fax: [phone]                                                         Physical Therapy Re-Certification Plan of [place]      Dear  Dr. Puja Fritz,    We had the pleasure of treating the following patient for physical therapy services at [address] [place] [address]. A summary of our findings can be found in the updated assessment below. This includes our plan of care. If you have any questions or concerns regarding these findings, please do not hesitate to contact me at the office phone number checked above. Thank you for the referral.     Physician Signature:________________________________Date:__________________  By signing above (or electronic signature), therapists plan is approved by physician    Total Visits to Date: 4  Overall Response to Treatment:   [x]Patient is responding well to treatment and improvement is noted with regards  to goals   []Patient should continue to improve in reasonable time if they continue HEP   []Patient has plateaued and is no longer responding to skilled PT intervention    []Patient is getting worse and would benefit from return to referring MD   []Patient unable to adhere to initial POC   [x]Other: Pt does report he is improving in shoulder pain however he did score lower on FOTO shoulder today. He does show improved shoulder ROM since assessment but still mild limitation in ER. He has shown improvements in abd strength but continues to be weaker and painful with shoulder flex and ER. He was progressed in shoulder RC and scapular strengthening today. He was given updated HEP again. He would like to do HEP on his own and follow up in 2 weeks again.  He will continue to benefit from skilled PT to progress shoulder strength to improve functional reach and lifting with L UE with less pain/discomfort. Physical Therapy Daily Treatment Note  Date:  2022    Patient Name:  Kalie Lacy    :  1955  MRN: [id number]    Medical/Treatment Diagnosis Information:  · Diagnosis: M75.82 (ICD-10-CM) - Rotator cuff tendinitis, left  · Treatment Diagnosis: M25.512 (L) shoulder pain  Insurance/Certification information:  PT Insurance Information: [employer], no auth, $40 copay  Physician Information:  Referring Practitioner: Virgil Rao MD  Has the plan of care been signed (Y/N):        [x]  Yes  []  No     Date of Patient follow up with Physician:       Is this a Progress Report:     [x]  Yes  []  No        If Yes:  Date Range for reporting period:  Beginning- 3/24/2022   Ending -22    Progress report will be due (10 Rx or 30 days whichever is less): 2/10/17      Recertification will be due (POC Duration  / 90 days whichever is less): as above         Visit # Insurance Allowable Auth Required   4 MN []  Yes [x]  No        Functional Scale: FOTO shoulder: 55 functional     Date assessed:  2022     Latex Allergy:  [x]NO      []YES  Preferred Language for Healthcare:   [x]English       []other:      Pain level:  4/10  on 2022  SUBJECTIVE: pt reports his shoulder is sore. He thinks he slept on it wrong last night. He does feel like his shoulder is better since starting PT. Pt still has pain with shoulder flexion and lifting anything in front of body. Some pain bring arm back to neutral from cross body stretch. He says shoulder catches sometimes still. He is able to play golf with shoulder and feels fine with this.      OBJECTIVE:    Observation:   Test measurements:  CERV ROM       Cervical Flexion WNL     Cervical Extension 75% limit but no pain     Cervical SB 75% limit but no pain     Cervical rotation 25% limit B but no pain                   ROM PROM AROM  Comment     L R L- 22 R     Flexion     163 165     Abduction     177 185     ER at side     80 65   ER at 90 abd     84 96     BB IR     T 10  T10     IR at 90 abd     72 65     Other             Other                    Strength L- 4/26/22 R Comment   Flexion 4- pain 4+     Abduction 4+ pain 4+     ER 4 pain 4     IR 5 5     Supraspinatus         Upper Trap         Lower Trap         Mid Trap         Rhomboids         Biceps 5 5     Triceps 5 5     Horizontal Abduction         Horizontal Adduction         Lats            Orthopedic Special Tests:   Special Tests Left Right   Apley Scratch IR:  ER:   Cross body: IR:  ER:  Cross Body:   Neer's       Full Can       Empty Can       [place]       Nerve Tension Testing       Speed's     Tirado's        Spurling's       Repeated Scaption       Drop Arm (supraspinatus)       ER lag sign (infraspinatus)       Belly Press (subscapularis       Lift off (subscapularis)       Apprehension test       ER internal impingement test                                        Reflexes/Sensation (myotomes/dermatomes): n/t     Joint mobility: n/t              []?Normal                       []?Hypo              []?Hyper     Palpation:no longer tender at biceps; mild tender distal to acromion     Functional Mobility/Transfers: Indep     Posture: forward head and shoulders B     Bandages/Dressings/Incisions: n/a     Gait: (include devices/WB status) Indep    RESTRICTIONS/PRECAUTIONS: lumbar discectomy in 2010; knee surgery 2016    Exercises/Interventions:     Exercise/Equipment Resistance/Repetitions Other comments   Aerobic Conditioning     Aerodyne          Stretching/PROM     ER stretch supine At 90 abd 10 x 10 secs    ER stretch seated     Table Slides     Wall slides      UE [place]     Pulleys     Pendulum     BB IR    SL IR    Pec doorway stretch 5 x 10 secs    CBA stretch     UT stretch     LS stretch     Isometrics     Retraction          Weight shift     Flexion    Abduction    External Rotation    Internal Rotation     Biceps     Triceps          PRE's     Scaption Abduction     External Rotation- SL     Internal Rotation     Shrugs     EXT- prone     Reverse Flys- prone     Y's - prone     Prone ER at 90 abd     Serratus 3 x 10 5 lbs    Horizontal Abd with ER     Biceps     Triceps     Retraction     External rotation at 90 abd (hitch hiker) 2x 10 2 lbs Cues to improve scap ret during exercise   Cable Column/Theraband     External Rotation 2 x 10 yellow TB    Internal Rotation 2 x 10 orange TB    Shrugs     Lats     Ext 2 x 10 orange TB    Flex     Scapular Retraction 3   X 10 orange, hold 2 secs TB    BIC     TRIC     PNF     Horizontal abd at 90 abd 2 x 10 green TB    Dynamic Stability     Ball on wall Push ups on wall     Push ups on ball on wall     90/90 ball taps     Body blade     Horizontal abd ball taps     Plyoback     UK flex 5' supine 140 arc    Manual tx     PROM L shoulder    STM            Patient education: Pt was educated on PT diagnosis, prognosis, and plan of care. Reviewed insurance benefits for physical therapy in an outpatient hospital based setting with the patient, including copay of $40 and allowable visit number. Pt was informed of possible out of pocket costs      Therapeutic Exercise and NMR EXR  [x] (72783) Provided verbal/tactile cueing for activities related to strengthening, flexibility, endurance, ROM  for improvements in scapular, scapulothoracic and UE control with self care, reaching, carrying, lifting, house/yardwork, driving/computer work.    [] (85837) Provided verbal/tactile cueing for activities related to improving balance, coordination, kinesthetic sense, posture, motor skill, proprioception  to assist with  scapular, scapulothoracic and UE control with self care, reaching, carrying, lifting, house/yardwork, driving/computer work.     Therapeutic Activities:    [x] (55252 or 93879) Provided verbal/tactile cueing for activities related to improving balance, coordination, kinesthetic sense, posture, motor skill, proprioception and motor activation to allow for proper function of scapular, scapulothoracic and UE control with self care, carrying, lifting, driving/computer work. Home Exercise Program:    [x] (78975) Reviewed/Progressed HEP activities related to strengthening, flexibility, endurance, ROM of scapular, scapulothoracic and UE control with self care, reaching, carrying, lifting, house/yardwork, driving/computer work  [] (63649) Reviewed/Progressed HEP activities related to improving balance, coordination, kinesthetic sense, posture, motor skill, proprioception of scapular, scapulothoracic and UE control with self care, reaching, carrying, lifting, house/yardwork, driving/computer work    Access Code: [email or web address]  URL: [employer]/  Date: 04/26/2022  Prepared by: Mariah Frazn    Exercises  Doorway Pec Stretch at 90 Degrees Abduction - 1 x daily - 7 x weekly - 5 sets - 1 reps - 20-30 hold  Supine Shoulder External Internal Rotation AAROM with Dowel - 2 x daily - 7 x weekly - 10 reps - 1 sets - 10 hold  Sleeper Stretch - 2 x daily - 7 x weekly - 10 reps - 1 sets - 10 hold  Standing Sleeper Stretch at [address] - 1 x daily - 7 x weekly - 3 sets - 10 reps  Standing Shoulder Internal Rotation Stretch with Towel - 2 x daily - 7 x weekly - 10 reps - 1 sets - 10 hold  Supine Scapular Protraction in Flexion with Dumbbells - 1 x daily - 7 x weekly - 3 sets - 10 reps  Supine Shoulder Flexion Extension Full Range AROM - 1 x daily - 7 x weekly - 1 reps - 1 sets  Seated Shoulder External Rotation in Abduction Supported with Dumbbell - 1 x daily - 7 x weekly - 3 sets - 10 reps  Standing Shoulder Horizontal Abduction with Resistance - 1 x daily - 7 x weekly - 2-3 sets - 10 reps  Shoulder External Rotation with Anchored Resistance - 1 x daily - 7 x weekly - 3 sets - 10 reps  Scapular Retraction with Resistance - 1 x daily - 7 x weekly - 10 reps - 3 sets  Shoulder extension with resistance - Neutral - 1 x daily - 7 x weekly - 3 sets - 10 reps  Seated Scapular Retraction - 2 x daily - 7 x weekly - 10 reps - 1 sets - 10 hold       Manual Treatments:  PROM / STM / Oscillations-Mobs:  G-I, II, III, IV (PA's, Inf., Post.)  [x] (35429) Provided manual therapy to mobilize soft tissue/joints of cervical/CT, scapular GHJ and UE for the purpose of modulating pain, promoting relaxation,  increasing ROM, reducing/eliminating soft tissue swelling/inflammation/restriction, improving soft tissue extensibility and allowing for proper ROM for normal function with self care, reaching, carrying, lifting, house/yardwork, driving/computer work    Modalities:      Charges:  Timed Code Treatment Minutes: 44   Total Treatment Minutes: 45     [] EVAL (LOW) 83964   [] EVAL (MOD) 47003   [] EVAL (HIGH) 14631   [] RE-EVAL   [x] AO(11700) x1     [] IONTO  [x] NMR (40312) x  1   [] VASO  [] Manual (24412) x     [] Other:  [x] TA x 1     [] Mech Traction (72729)  [] ES(attended) (26153)      [] ES (un) (98424):       Iain Leahy stated goal: reduce pain     Therapist goals for Patient:   Short Term Goals: To be achieved in: 2 weeks  1. Independent in HEP and progression per patient tolerance, in order to prevent re-injury. []? Progressing: [x]? Met: []? Not Met: []? Adjusted   2. Patient will have a decrease in pain to facilitate improvement in movement, function, and ADLs as indicated by Functional Deficits. []? Progressing: [x]? Met: []? Not Met: []? Adjusted     Long Term Goals: To be achieved in: 4-6 weeks  1. Disability index score of 65 or more for the FOTO shoulder to assist with reaching prior level of function. []? Progressing: []? Met: [x]? Not Met: []? Adjusted  2. Patient will demonstrate increased L  shoulder AROM to 80 ER at 90 abd to allow for proper joint functioning as indicated by patients Functional Deficits. []? Progressing: [x]? Met: []? Not Met: []? Adjusted  3.  Patient will demonstrate an increase in L shoulder strength to 4+/5 flex, abd, ER in UE to allow for proper functional mobility as indicated by patients Functional Deficits. [x]? Progressing: []? Met: [x]? Not Met: []? Adjusted  4. Patient will return to pushing up with hands for supine to sit and sit to stand transfers without increased symptoms or restriction. [x]? Progressing: []? Met: [x]? Not Met: []? Adjusted  5. Pt will return to lifting light objects overhead for ADLs without pain in L shoulder. [x]? Progressing: []? Met: [x]? Not Met: []? Adjusted         Overall Progression Towards Functional goals/ Treatment Progress Update:  [x] Patient is progressing as expected towards functional goals listed. [] Progression is slowed due to complexities/Impairments listed. [] Progression has been slowed due to co-morbidities. [] Plan just implemented, too soon to assess goals progression <30days   [] Goals require adjustment due to lack of progress  [] Patient is not progressing as expected and requires additional follow up with physician  [] Other    Prognosis for POC: [x] Good [] Fair  [] Poor      Patient requires continued skilled intervention: [x] Yes  [] No      ASSESSMENT:      Treatment/Activity Tolerance:  [x] Patient tolerated treatment well [] Patient limited by fatique  [] Patient limited by pain  [] Patient limited by other medical complications  [x] Other: Pt does report he is improving in shoulder pain however he did score lower on FOTO shoulder today. He does show improved shoulder ROM since assessment but still mild limitation in ER. He has shown improvements in abd strength but continues to be weaker and painful with shoulder flex and ER. He was progressed in shoulder RC and scapular strengthening today. He was given updated HEP again. He would like to do HEP on his own and follow up in 2 weeks again. He will continue to benefit from skilled PT to progress shoulder strength to improve functional reach and lifting with L UE with less pain/discomfort.    Return to Play: (if applicable)   []  Stage 1: Intro to Strength   []  Stage 2: Return to Run and Strength   []  Stage 3: Return to Jump and Strength   []  Stage 4: Dynamic Strength and Agility   []  Stage 5: Sport Specific Training     []  Ready to Return to Play, Meets All Above Stages   []  Not Ready for Return to Sports   Comments:            Prognosis: [x] Good [] Fair  [] Poor    Patient Requires Follow-up: [x] Yes  [] No    PLAN: 1x/week to every other week for 4 more visits. (4/26/22)   [x] Continue per plan of care [] Alter current plan (see comments above)  [] Plan of care initiated [] Hold pending MD visit [] Discharge    Note: If patient does not return for scheduled/ recommended follow up visits, this note will serve as a discharge from care along with most recent update on progress.      Electronically signed by: Gurjit Xavier, PT, DPT

## 2022-05-10 ENCOUNTER — APPOINTMENT (OUTPATIENT)
Dept: PHYSICAL THERAPY | Age: 67
End: 2022-05-10
Payer: MEDICARE

## 2022-05-17 ENCOUNTER — HOSPITAL ENCOUNTER (OUTPATIENT)
Dept: PHYSICAL THERAPY | Age: 67
Setting detail: THERAPIES SERIES
Discharge: HOME | End: 2022-05-17
Payer: MEDICARE

## 2022-05-17 PROCEDURE — 97530 THERAPEUTIC ACTIVITIES: CPT | Performed by: PHYSICAL THERAPIST

## 2022-05-17 PROCEDURE — 97110 THERAPEUTIC EXERCISES: CPT | Performed by: PHYSICAL THERAPIST

## 2022-05-17 PROCEDURE — 97112 NEUROMUSCULAR REEDUCATION: CPT | Performed by: PHYSICAL THERAPIST

## 2022-05-17 NOTE — FLOWSHEET NOTE
[facility], [place] [address]. [address], [facility]  Phone: [phone]   Fax: [phone]                                                        Physical Therapy Daily Treatment Note  Date:  2022    Patient Name:  Hay Mayer    :  1955  MRN: [id number]    Medical/Treatment Diagnosis Information:  · Diagnosis: M75.82 (ICD-10-CM) - Rotator cuff tendinitis, left  · Treatment Diagnosis: M25.512 (L) shoulder pain  Insurance/Certification information:  PT Insurance Information: [employer], [place], no auth, $40 copay  Physician Information:  Referring Practitioner: Wilberto Matos MD  Has the plan of care been signed (Y/N):        [x]  Yes  []  No     Date of Patient follow up with Physician:       Is this a Progress Report:     [x]  Yes  []  No        If Yes:  Date Range for reporting period:  Beginning- 3/24/2022   Ending -22    Progress report will be due (10 Rx or 30 days whichever is less):       Recertification will be due (POC Duration  / 90 days whichever is less): as above         Visit # Insurance Allowable Auth Required   5 [place] []  Yes [x]  No        Functional Scale: FOTO shoulder: 55 functional     Date assessed:  2022     Latex Allergy:  [x]NO      []YES  Preferred Language for Healthcare:   [x]English       []other:      Pain level:  7-0/84 with certain movements; 5/94 at rest  on 2022  SUBJECTIVE: pt says shoulder overall feeling better in shoulder last few days. He has been working on stretches daily and strengthening every other day. He says once in a while he will get intermittent pains in shoulder moving around.      OBJECTIVE:    Observation:   Test measurements:  CERV ROM       Cervical Flexion WNL     Cervical Extension 75% limit but no pain     Cervical SB 75% limit but no pain     Cervical rotation 25% limit B but no pain                   ROM PROM AROM  Comment     L R L- 4/26/22 R     Flexion     163 165     Abduction     177 185     ER at side     80 65     ER at 90 abd     84 96     BB IR     T 10  T10     IR at 90 abd     72 65     Other             Other                    Strength L- 4/26/22 R Comment   Flexion 4- pain 4+     Abduction 4+ pain 4+     ER 4 pain 4     IR 5 5     Supraspinatus         Upper Trap         Lower Trap         Mid Trap         Rhomboids         Biceps 5 5     Triceps 5 5     Horizontal Abduction         Horizontal Adduction         Lats            Orthopedic Special Tests:   Special Tests Left Right   Apley Scratch IR:  ER:   Cross body: IR:  ER:  Cross Body:   Neer's       Full Can       Empty Can       Ghada Hua       Nerve Tension Testing       Speed's     Tirado's        Spurling's       Repeated Scaption       Drop Arm (supraspinatus)       ER lag sign (infraspinatus)       Belly Press (subscapularis       Lift off (subscapularis)       Apprehension test       ER internal impingement test                                        Reflexes/Sensation (myotomes/dermatomes): n/t     Joint mobility: n/t              []?Normal                       []?Hypo              []?Hyper     Palpation:no longer tender at biceps; mild tender distal to acromion     Functional Mobility/Transfers: Indep     Posture: forward head and shoulders B     Bandages/Dressings/Incisions: n/a     Gait: (include devices/WB status) Indep    RESTRICTIONS/PRECAUTIONS: lumbar discectomy in 2010; knee surgery 2016    Exercises/Interventions:     Exercise/Equipment Resistance/Repetitions Other comments   Aerobic Conditioning     Aerodyne          Stretching/PROM     ER stretch supine At 90 abd 5 x 10 secs    ER stretch seated     Table Slides     Wall slides      UE [place]     Pulleys     Pendulum     BB IR    SL IR    Pec doorway stretch 5 x 10 secs    CBA stretch     UT stretch     LS stretch     Isometrics     Retraction          Weight shift Flexion    Abduction    External Rotation    Internal Rotation     Biceps     Triceps          PRE's     Scaption     Abduction     External Rotation- SL     Internal Rotation     Shrugs     EXT- prone     Reverse Flys- prone     Y's - prone     Prone ER at 90 abd     Serratus 3 x 10 10 lbs    Horizontal Abd with ER     Biceps     Triceps     Retraction     External rotation at 90 abd (hitch hiker) 3x 10 5 lbs Cues to improve scap ret during exercise   Cable Column/Theraband     ER isometric to upper cut 10 x 5 secs hold yellow TB    External Rotation 3 x 10 yellow TB    Internal Rotation 3 x 10 orange TB    Shrugs     Lats     Ext 3 x 10 orange TB    Flex     Scapular Retraction 3   X 10 orange, hold 2 secs TB    BIC     TRIC     PNF     Horizontal abd at 90 abd  - painful 5/17 at end of session so held    Dynamic Stability     Ball on wall Push ups on wall     Push ups on ball on wall     90/90 ball taps     Body blade     Horizontal abd ball taps     Plyoback     UK flex 5' supine 140 arc, 2 lbs    Manual tx     PROM L shoulder    STM            Patient education: Pt was educated on PT diagnosis, prognosis, and plan of care. Reviewed insurance benefits for physical therapy in an outpatient hospital based setting with the patient, including copay of $40 and allowable visit number.  Pt was informed of possible out of pocket costs      Therapeutic Exercise and NMR EXR  [x] (74375) Provided verbal/tactile cueing for activities related to strengthening, flexibility, endurance, ROM  for improvements in scapular, scapulothoracic and UE control with self care, reaching, carrying, lifting, house/yardwork, driving/computer work.    [] (51425) Provided verbal/tactile cueing for activities related to improving balance, coordination, kinesthetic sense, posture, motor skill, proprioception  to assist with  scapular, scapulothoracic and UE control with self care, reaching, carrying, lifting, house/yardwork, driving/computer work.    Therapeutic Activities:    [x] (34980 or 76519) Provided verbal/tactile cueing for activities related to improving balance, coordination, kinesthetic sense, posture, motor skill, proprioception and motor activation to allow for proper function of scapular, scapulothoracic and UE control with self care, carrying, lifting, driving/computer work. Home Exercise Program:    [x] (41913) Reviewed/Progressed HEP activities related to strengthening, flexibility, endurance, ROM of scapular, scapulothoracic and UE control with self care, reaching, carrying, lifting, house/yardwork, driving/computer work  [] (90202) Reviewed/Progressed HEP activities related to improving balance, coordination, kinesthetic sense, posture, motor skill, proprioception of scapular, scapulothoracic and UE control with self care, reaching, carrying, lifting, house/yardwork, driving/computer work    Access Code: [email or web address]  URL: [employer]/  Date: 04/26/2022  Prepared by: Prashanth Apodaca    Exercises  Doorway Pec Stretch at 90 Degrees Abduction - 1 x daily - 7 x weekly - 5 sets - 1 reps - 20-30 hold  Supine Shoulder External Internal Rotation AAROM with Dowel - 2 x daily - 7 x weekly - 10 reps - 1 sets - 10 hold  Sleeper Stretch - 2 x daily - 7 x weekly - 10 reps - 1 sets - 10 hold  Standing Sleeper Stretch at [place] - 1 x daily - 7 x weekly - 3 sets - 10 reps  Standing Shoulder Internal Rotation Stretch with Towel - 2 x daily - 7 x weekly - 10 reps - 1 sets - 10 hold  Supine Scapular Protraction in Flexion with Dumbbells - 1 x daily - 7 x weekly - 3 sets - 10 reps  Supine Shoulder Flexion Extension Full Range AROM - 1 x daily - 7 x weekly - 1 reps - 1 sets  Seated Shoulder External Rotation in Abduction Supported with Dumbbell - 1 x daily - 7 x weekly - 3 sets - 10 reps  Standing Shoulder Horizontal Abduction with Resistance - 1 x daily - 7 x weekly - 2-3 sets - 10 reps  Shoulder External Rotation with Anchored Resistance - 1 x daily - 7 x weekly - 3 sets - 10 reps  Scapular Retraction with Resistance - 1 x daily - 7 x weekly - 10 reps - 3 sets  Shoulder extension with resistance - Neutral - 1 x daily - 7 x weekly - 3 sets - 10 reps  Seated Scapular Retraction - 2 x daily - 7 x weekly - 10 reps - 1 sets - 10 hold       Manual Treatments:  PROM / STM / Oscillations-Mobs:  G-I, II, III, IV (PA's, Inf., Post.)  [x] (68245) Provided manual therapy to mobilize soft tissue/joints of cervical/CT, scapular GHJ and UE for the purpose of modulating pain, promoting relaxation,  increasing ROM, reducing/eliminating soft tissue swelling/inflammation/restriction, improving soft tissue extensibility and allowing for proper ROM for normal function with self care, reaching, carrying, lifting, house/yardwork, driving/computer work    Modalities:      Charges:  Timed Code Treatment Minutes: 38   Total Treatment Minutes: 38     [] EVAL (LOW) 65937   [] EVAL (MOD) 22953   [] EVAL (HIGH) 86274   [] RE-EVAL   [x] IL(61060) x1     [] IONTO  [x] NMR (85026) x  1   [] VASO  [] Manual (27333) x     [] Other:  [x] TA x 1     [] Mech Traction (63014)  [] ES(attended) (16012)      [] ES (un) (12436):       Nik Matos stated goal: reduce pain     Therapist goals for Patient:   Short Term Goals: To be achieved in: 2 weeks  1. Independent in HEP and progression per patient tolerance, in order to prevent re-injury. []? Progressing: [x]? Met: []? Not Met: []? Adjusted   2. Patient will have a decrease in pain to facilitate improvement in movement, function, and ADLs as indicated by Functional Deficits. []? Progressing: [x]? Met: []? Not Met: []? Adjusted     Long Term Goals: To be achieved in: 4-6 weeks  1. Disability index score of 65 or more for the FOTO shoulder to assist with reaching prior level of function. []? Progressing: []? Met: [x]? Not Met: []? Adjusted  2.  Patient will demonstrate increased L  shoulder AROM to 80 ER at 90 abd to allow for proper joint functioning as indicated by patients Functional Deficits. []? Progressing: [x]? Met: []? Not Met: []? Adjusted  3. Patient will demonstrate an increase in L shoulder strength to 4+/5 flex, abd, ER in UE to allow for proper functional mobility as indicated by patients Functional Deficits. [x]? Progressing: []? Met: [x]? Not Met: []? Adjusted  4. Patient will return to pushing up with hands for supine to sit and sit to stand transfers without increased symptoms or restriction. [x]? Progressing: []? Met: [x]? Not Met: []? Adjusted  5. Pt will return to lifting light objects overhead for ADLs without pain in L shoulder. [x]? Progressing: []? Met: [x]? Not Met: []? Adjusted         Overall Progression Towards Functional goals/ Treatment Progress Update:  [x] Patient is progressing as expected towards functional goals listed. [] Progression is slowed due to complexities/Impairments listed. [] Progression has been slowed due to co-morbidities. [] Plan just implemented, too soon to assess goals progression <30days   [] Goals require adjustment due to lack of progress  [] Patient is not progressing as expected and requires additional follow up with physician  [] Other    Prognosis for POC: [x] Good [] Fair  [] Poor      Patient requires continued skilled intervention: [x] Yes  [] No      ASSESSMENT:      Treatment/Activity Tolerance:  [x] Patient tolerated treatment well [] Patient limited by fatique  [] Patient limited by pain  [] Patient limited by other medical complications  [x] Other: Pt does seem to be improving with his shoulder pain. He was able to tolerate more resistance to several exercises today and also more sets of several exercises in session. He did have pain with H abd exercise today so this was held. this is likely due to this being at end of session today as he states it is usually pain-free when he does at home.  He was challenged with ER isometric with flex exercise today and this was added to HEP. He was encouraged to continue with strengthening every other day. He will continue to benefit from skilled PT to progress shoulder strength to improve functional reach and lifting with L UE with less pain/discomfort. Return to Play: (if applicable)   []  Stage 1: Intro to Strength   []  Stage 2: Return to Run and Strength   []  Stage 3: Return to Jump and Strength   []  Stage 4: Dynamic Strength and Agility   []  Stage 5: Sport Specific Training     []  Ready to Return to Play, Meets All Above Stages   []  Not Ready for Return to Sports   Comments:            Prognosis: [x] Good [] Fair  [] Poor    Patient Requires Follow-up: [x] Yes  [] No    PLAN: 1x/week to every other week for 4 more visits. (4/26/22). Pt would like to follow up in 3 weeks. [x] Continue per plan of care [] Alter current plan (see comments above)  [] Plan of care initiated [] Hold pending MD visit [] Discharge    Note: If patient does not return for scheduled/ recommended follow up visits, this note will serve as a discharge from care along with most recent update on progress.      Electronically signed by: Mary Lara, PT, DPT

## 2022-06-07 ENCOUNTER — HOSPITAL ENCOUNTER (OUTPATIENT)
Dept: PHYSICAL THERAPY | Age: 67
Setting detail: THERAPIES SERIES
Discharge: HOME | End: 2022-06-07
Payer: MEDICARE

## 2022-06-07 PROCEDURE — 97530 THERAPEUTIC ACTIVITIES: CPT | Performed by: PHYSICAL THERAPIST

## 2022-06-07 PROCEDURE — 97110 THERAPEUTIC EXERCISES: CPT | Performed by: PHYSICAL THERAPIST

## 2022-06-07 PROCEDURE — 97112 NEUROMUSCULAR REEDUCATION: CPT | Performed by: PHYSICAL THERAPIST

## 2022-06-07 NOTE — PLAN OF CARE
[facility]  Phone: [phone]   Fax: [phone]                                                    Physical Therapy Re-Certification Plan of Lorenzo Samuel      Dear  Dr. Rex Cruz,    We had the pleasure of treating the following patient for physical therapy services at [address] [place] [address]. A summary of our findings can be found in the updated assessment below. This includes our plan of care. If you have any questions or concerns regarding these findings, please do not hesitate to contact me at the office phone number checked above. Thank you for the referral.     Physician Signature:________________________________Date:__________________  By signing above (or electronic signature), therapists plan is approved by physician  Total Visits to Date: 6  Overall Response to Treatment:   [x]Patient is responding well to treatment and improvement is noted with regards  to goals   []Patient should continue to improve in reasonable time if they continue HEP   []Patient has plateaued and is no longer responding to skilled PT intervention    []Patient is getting worse and would benefit from return to referring MD   []Patient unable to adhere to initial POC   [x]Other: Pt does continue to be improving with his shoulder pain with less pain reaching overhead. However he still has some pain with lifting weight over shoulder height, pushing himself up out of bed and with heavy household chores. He does show good motion now throughout shoulder. His strength is also progressing well with mild pain still with resisted abd and flex. He was able to progress in resistance on several exercises today and was given an updated HEP again to work on strength every other day. Luci Pérez  He will continue to benefit from skilled PT to progress shoulder strength to improve ability to lift overhead and perform household work without limitation in L UE. Physical Therapy Daily Treatment Note  Date:  2022    Patient Name:  Chinyere Taylor    :  1955  MRN: [id number]    Medical/Treatment Diagnosis Information:  · Diagnosis: M75.82 (ICD-10-CM) - Rotator cuff tendinitis, left  · Treatment Diagnosis: M25.512 (L) shoulder pain  Insurance/Certification information:  PT Insurance Information: [employer], [place], no auth, $40 copay  Physician Information:  Referring Practitioner: Neel Stauffer MD  Has the plan of care been signed (Y/N):        [x]  Yes  []  No     Date of Patient follow up with Physician:       Is this a Progress Report:     [x]  Yes  []  No        If Yes:  Date Range for reporting period:  Beginning- 3/24/2022   Ending -22    Progress report will be due (10 Rx or 30 days whichever is less):       Recertification will be due (POC Duration  / 90 days whichever is less): as above         Visit # Insurance Allowable Auth Required   6 [place] []  Yes [x]  No        Functional Scale: FOTO shoulder: 61 functional     Date assessed:  2022     Latex Allergy:  [x]NO      []YES  Preferred Language for Healthcare:   [x]English       []other:      Pain level:  8-8/ with certain movements;  at rest  on 2022  SUBJECTIVE:  Pt reports he is seeing improvement in shoulder. He feels like he is 80% back to feeling normal in shoulder. He no longer has pain with active shoulder elevation but does have pain if he were to lift a weight over shoulder height. He still would have pain with heavy activities around the home. Exercises are going well and feels less discomfort with ER stretching.  He also still has pain pushing himself up out of bed    OBJECTIVE:    Observation:   Test measurements:  CERV ROM       Cervical Flexion WNL     Cervical Extension 75% limit but no pain     Cervical SB 75% limit but no pain     Cervical rotation 25% limit B but no pain                   ROM PROM AROM  Comment     L R L- 6/7/22 R     Flexion     170 165     Abduction     178 185     ER at side     80 65     ER at 90 abd     89 96     BB IR     T 9 T10     IR at 90 abd     72 65     Other             Other                    Strength L- 6/7/22 R Comment   Flexion 4+ mild pain 4+     Abduction 4+ mild pain 4+     ER 4+ 4     IR 5 5     Supraspinatus         Upper Trap         Lower Trap         Mid Trap         Rhomboids         Biceps 5 5     Triceps 5 5     Horizontal Abduction         Horizontal Adduction         Lats            Orthopedic Special Tests:   Special Tests Left Right   Apley Scratch IR:  ER:   Cross body: IR:  ER:  Cross Body:   Neer's       Full Can       Empty Can       Ariel Hart       Nerve Tension Testing       Speed's     Tirado's        Spurling's       Repeated Scaption       Drop Arm (supraspinatus)       ER lag sign (infraspinatus)       Belly Press (subscapularis       Lift off (subscapularis)       Apprehension test       ER internal impingement test                                        Reflexes/Sensation (myotomes/dermatomes): n/t     Joint mobility: n/t              []?Normal                       []?Hypo              []?Hyper     Palpation:no longer tender throughout shoulder  Functional Mobility/Transfers: Indep     Posture: forward head and shoulders B     Bandages/Dressings/Incisions: n/a     Gait: (include devices/WB status) Indep    RESTRICTIONS/PRECAUTIONS: lumbar discectomy in 2010; knee surgery 2016    Exercises/Interventions:     Exercise/Equipment Resistance/Repetitions Other comments   Aerobic Conditioning     Aerodyne          Stretching/PROM     ER stretch supine     ER stretch seated     Table Slides     Wall slides      UE [place]     Pulleys     Pendulum     BB IR    SL IR    Pec doorway stretch 5 x 10 secs    CBA stretch     UT stretch     LS stretch     Isometrics     Retraction          Weight shift     Flexion    Abduction    External Rotation Internal Rotation     Biceps     Triceps          PRE's     Scaption 2 x 10 1 lbs B Cues for improved posture/scap ret first   Abduction     External Rotation- SL     Internal Rotation     Shrugs     EXT- prone     Reverse Flys- prone     Y's - prone     Prone ER at 90 abd     Serratus 3 x 10 10 lbs    Horizontal Abd with ER     Biceps     Triceps     Retraction     External rotation at 90 abd (hitch hiker) 3x 10 6 lbs Cues to improve scap ret during exercise   Cable Column/Theraband     ER isometric to upper cut 10 x 5 secs hold yellow TB    External Rotation 3 x 10 green TB    Internal Rotation 3 x 10 orange TB    Shrugs     Lats     Ext 3 x 10 orange TB    Flex     Scapular Retraction 3   X 10 orange, hold 2 secs TB    BIC     TRIC     PNF     Horizontal abd at 90 abd  - painful 5/17 at end of session so held  Prone 2  X 10 hold 2 secs    Dynamic Stability     Ball on wall Push ups on wall 3 x 10     Push ups on ball on wall     90/90 ball taps     Body blade     Horizontal abd ball taps     Plyoback     UK flex     Manual tx     PROM L shoulder    STM            Patient education: Pt was educated on PT diagnosis, prognosis, and plan of care. Reviewed insurance benefits for physical therapy in an outpatient hospital based setting with the patient, including copay of $40 and allowable visit number.  Pt was informed of possible out of pocket costs      Therapeutic Exercise and NMR EXR  [x] (17939) Provided verbal/tactile cueing for activities related to strengthening, flexibility, endurance, ROM  for improvements in scapular, scapulothoracic and UE control with self care, reaching, carrying, lifting, house/yardwork, driving/computer work.    [] (34134) Provided verbal/tactile cueing for activities related to improving balance, coordination, kinesthetic sense, posture, motor skill, proprioception  to assist with  scapular, scapulothoracic and UE control with self care, reaching, carrying, lifting, house/yardwork, driving/computer work. Therapeutic Activities:    [x] (17156 or 22213) Provided verbal/tactile cueing for activities related to improving balance, coordination, kinesthetic sense, posture, motor skill, proprioception and motor activation to allow for proper function of scapular, scapulothoracic and UE control with self care, carrying, lifting, driving/computer work. Home Exercise Program:    [x] (89247) Reviewed/Progressed HEP activities related to strengthening, flexibility, endurance, ROM of scapular, scapulothoracic and UE control with self care, reaching, carrying, lifting, house/yardwork, driving/computer work  [] (45239) Reviewed/Progressed HEP activities related to improving balance, coordination, kinesthetic sense, posture, motor skill, proprioception of scapular, scapulothoracic and UE control with self care, reaching, carrying, lifting, house/yardwork, driving/computer work    Access Code: [email or web address]  URL: [email or web address]  Date: 06/07/2022  Prepared by: Caitlyn Storm    Exercises  Doorway Pec Stretch at 90 Degrees Abduction - 1 x daily - 7 x weekly - 5 sets - 1 reps - 20-30 hold  Supine Shoulder External Internal Rotation AAROM with Dowel - 2 x daily - 7 x weekly - 10 reps - 1 sets - 10 hold  Supine Scapular Protraction in Flexion with Dumbbells - 1 x daily - 7 x weekly - 3 sets - 10 reps  Seated Shoulder External Rotation in Abduction Supported with Dumbbell - 1 x daily - 7 x weekly - 3 sets - 10 reps  Standing Shoulder Horizontal Abduction with Resistance - 1 x daily - 7 x weekly - 2-3 sets - 10 reps  Prone Shoulder Horizontal Abduction - 1 x daily - 7 x weekly - 3 sets - 10 reps  Standing Shoulder Scaption - 1 x daily - 7 x weekly - 3 sets - 10 reps  Shoulder External Rotation Reactive Isometrics - 1 x daily - 7 x weekly - 3 sets - 10 reps  Scapular Retraction with Resistance - 1 x daily - 7 x weekly - 10 reps - 3 sets  Shoulder extension with resistance - Neutral - 1 x daily - 7 x weekly - 3 sets - 10 reps  Shoulder External Rotation with Anchored Resistance - 1 x daily - 7 x weekly - 3 sets - 10 reps  Seated Scapular Retraction - 2 x daily - 7 x weekly - 10 reps - 1 sets - 10 hold       Manual Treatments:  PROM / STM / Oscillations-Mobs:  G-I, II, III, IV (PA's, Inf., Post.)  [] (48081) Provided manual therapy to mobilize soft tissue/joints of cervical/CT, scapular GHJ and UE for the purpose of modulating pain, promoting relaxation,  increasing ROM, reducing/eliminating soft tissue swelling/inflammation/restriction, improving soft tissue extensibility and allowing for proper ROM for normal function with self care, reaching, carrying, lifting, house/yardwork, driving/computer work    Modalities:      Charges:  Timed Code Treatment Minutes: 48   Total Treatment Minutes: 48     [] EVAL (LOW) 82348   [] EVAL (MOD) 48458   [] EVAL (HIGH) 20333   [] RE-EVAL   [x] HO(80466) x1     [] IONTO  [x] NMR (95329) x  1   [] VASO  [] Manual (00917) x     [] Other:  [x] TA x 1     [] Mech Traction (83836)  [] ES(attended) (28274)      [] ES (un) (50092):       Magali Nance stated goal: reduce pain     Therapist goals for Patient:   Short Term Goals: To be achieved in: 2 weeks  1. Independent in HEP and progression per patient tolerance, in order to prevent re-injury. []? Progressing: [x]? Met: []? Not Met: []? Adjusted   2. Patient will have a decrease in pain to facilitate improvement in movement, function, and ADLs as indicated by Functional Deficits. []? Progressing: [x]? Met: []? Not Met: []? Adjusted     Long Term Goals: To be achieved in: 4-6 weeks  1. Disability index score of 65 or more for the FOTO shoulder to assist with reaching prior level of function. []? Progressing: []? Met: [x]? Not Met: []? Adjusted  2. Patient will demonstrate increased L  shoulder AROM to 80 ER at 90 abd to allow for proper joint functioning as indicated by patients Functional Deficits. []? Progressing: [x]? Met: []? Not Met: []? Adjusted  3. Patient will demonstrate an increase in L shoulder strength to 4+/5 flex, abd, ER in UE to allow for proper functional mobility as indicated by patients Functional Deficits. []? Progressing: [x]? Met: []? Not Met: []? Adjusted  4. Patient will return to pushing up with hands for supine to sit and sit to stand transfers without increased symptoms or restriction. [x]? Progressing: []? Met: [x]? Not Met: []? Adjusted  5. Pt will return to lifting light objects overhead for ADLs without pain in L shoulder. [x]? Progressing: []? Met: [x]? Not Met: []? Adjusted         Overall Progression Towards Functional goals/ Treatment Progress Update:  [x] Patient is progressing as expected towards functional goals listed. [] Progression is slowed due to complexities/Impairments listed. [] Progression has been slowed due to co-morbidities. [] Plan just implemented, too soon to assess goals progression <30days   [] Goals require adjustment due to lack of progress  [] Patient is not progressing as expected and requires additional follow up with physician  [] Other    Prognosis for POC: [x] Good [] Fair  [] Poor      Patient requires continued skilled intervention: [x] Yes  [] No      ASSESSMENT:      Treatment/Activity Tolerance:  [x] Patient tolerated treatment well [] Patient limited by fatique  [] Patient limited by pain  [] Patient limited by other medical complications  [x] Other: Pt does continue to be improving with his shoulder pain with less pain reaching overhead. However he still has some pain with lifting weight over shoulder height, pushing himself up out of bed and with heavy household chores. He does show good motion now throughout shoulder. His strength is also progressing well with mild pain still with resisted abd and flex. He was able to progress in resistance on several exercises today and was given an updated HEP again to work on strength every other day.  He did need cueing throughout exercises to improve posture and scapular retraction before starting exercises. He will continue to benefit from skilled PT to progress shoulder strength to improve ability to lift overhead and perform household work without limitation in L UE. Return to Play: (if applicable)   []  Stage 1: Intro to Strength   []  Stage 2: Return to Run and Strength   []  Stage 3: Return to Jump and Strength   []  Stage 4: Dynamic Strength and Agility   []  Stage 5: Sport Specific Training     []  Ready to Return to Play, Meets All Above Stages   []  Not Ready for Return to Sports   Comments:            Prognosis: [x] Good [] Fair  [] Poor    Patient Requires Follow-up: [x] Yes  [] No    PLAN: Pt would like to follow up in 3-4 weeks again - 6/7/22   [x] Continue per plan of care [] Alter current plan (see comments above)  [] Plan of care initiated [] Hold pending MD visit [] Discharge    Note: If patient does not return for scheduled/ recommended follow up visits, this note will serve as a discharge from care along with most recent update on progress.      Electronically signed by: Dipti Benitez, PT, DPT

## 2022-07-12 ENCOUNTER — HOSPITAL ENCOUNTER (OUTPATIENT)
Dept: PHYSICAL THERAPY | Age: 67
Setting detail: THERAPIES SERIES
Discharge: HOME | End: 2022-07-12
Payer: MEDICARE

## 2022-07-12 PROCEDURE — 97112 NEUROMUSCULAR REEDUCATION: CPT | Performed by: PHYSICAL THERAPIST

## 2022-07-12 PROCEDURE — 97530 THERAPEUTIC ACTIVITIES: CPT | Performed by: PHYSICAL THERAPIST

## 2022-07-12 PROCEDURE — 97110 THERAPEUTIC EXERCISES: CPT | Performed by: PHYSICAL THERAPIST

## 2022-07-12 NOTE — PLAN OF CARE
exercises to improve posture and scapular retraction before starting exercises. He will follow up in a month again to check on progress. He will continue to benefit from skilled PT to progress shoulder strength to improve ability to lift overhead and perform household work without limitation in L UE. Physical Therapy Daily Treatment Note  Date:  2022    Patient Name:  Dane Ventura    :  1955  MRN: [id number]    Medical/Treatment Diagnosis Information:  · Diagnosis: M75.82 (ICD-10-CM) - Rotator cuff tendinitis, left  · Treatment Diagnosis: M25.512 (L) shoulder pain  Insurance/Certification information:  PT Insurance Information: [employer], [place], no auth, $40 copay  Physician Information:  Referring Practitioner: Augustus Zeng MD  Has the plan of care been signed (Y/N):        [x]  Yes  []  No     Date of Patient follow up with Physician:       Is this a Progress Report:     [x]  Yes  []  No        If Yes:  Date Range for reporting period:  Beginning- 3/24/2022   Ending -22    Progress report will be due (10 Rx or 30 days whichever is less):       Recertification will be due (POC Duration  / 90 days whichever is less): as above          Visit # Insurance Allowable Auth Required   7 [place] []  Yes [x]  No        Functional Scale: FOTO shoulder: 63 functional     Date assessed:  2022     Latex Allergy:  [x]NO      []YES  Preferred Language for Healthcare:   [x]English       []other:      Pain level:   0/10 at rest  on 2022  SUBJECTIVE:  Pt feels like he is 85% back to normal.  He continues to feel better. He reports does really notice it too much. Hasn't really noticed it pushing himself out of bed or reaching over head for ADLs. He will still get intermittent pains through.      OBJECTIVE:    Observation:   Test measurements:  CERV ROM       Cervical Flexion WNL     Cervical Extension 75% limit but no pain     Cervical SB 75% limit but no pain     Cervical rotation 25% limit B but no pain                   ROM PROM AROM  Comment     L R L- 7/12/22 R     Flexion      165     Abduction     [id number]     ER at side     65     ER at 90 abd     82 96     BB IR     T10     IR at 90 abd     65     Other             Other                    Strength L- 7/12/22 R Comment   Flexion 4+ very mild pain 4+     Abduction 4+  4+     ER 4+ 4     IR 5 5     Supraspinatus         Upper Trap         Lower Trap         Mid Trap         Rhomboids         Biceps 5 5     Triceps 5 5     Horizontal Abduction         Horizontal Adduction         Lats            Orthopedic Special Tests:   Special Tests Left Right   Apley Scratch IR:  ER:   Cross body: IR:  ER:  Cross Body:   Neer's       Full Can       Empty Can       [place]on Medialle Pace       Nerve Tension Testing       Speed's     Tirado's        Spurling's       Repeated Scaption       Drop Arm (supraspinatus)       ER lag sign (infraspinatus)       Belly Press (subscapularis       Lift off (subscapularis)       Apprehension test       ER internal impingement test                                        Reflexes/Sensation (myotomes/dermatomes): n/t     Joint mobility: n/t              []?Normal                       []?Hypo              []?Hyper     Palpation:no longer tender throughout shoulder  Functional Mobility/Transfers: Indep     Posture: forward head and shoulders B     Bandages/Dressings/Incisions: n/a     Gait: (include devices/WB status) Indep    RESTRICTIONS/PRECAUTIONS: lumbar discectomy in 2010; knee surgery 2016    Exercises/Interventions:     Exercise/Equipment Resistance/Repetitions Other comments   Aerobic Conditioning     Aerodyne          Stretching/PROM     ER stretch supine     ER stretch seated     Table Slides     Wall slides      UE [place]     Pulleys     Pendulum     BB IR    SL IR    Pec doorway stretch 3 x 30  secs    CBA stretch     UT stretch     LS stretch     Isometrics     Retraction          Weight shift     Flexion Abduction    External Rotation    Internal Rotation     Biceps     Triceps          PRE's     Scaption 3 x 10 3 lbs B Cues for improved posture/scap ret first   Abduction     External Rotation- SL     Internal Rotation     Shrugs     EXT- prone     Reverse Flys- prone     Y's - prone     Prone ER at 90 abd     Serratus 3 x 10 10 lbs    Horizontal Abd with ER     Biceps     Triceps     Retraction     External rotation at 90 abd (hitch hiker) 2x 10 8 lbs Cues to improve scap ret during exercise   Cable Column/Theraband     ER isometric to upper cut 10 x 5 secs hold yellow TB    External Rotation 3 x 10 green TB    Internal Rotation 3 x 10 orange TB    Shrugs     Lats     Ext 3 x 10 orange TB    Flex     Scapular Retraction 3   X 10 blue, hold 2 secs TB    BIC     TRIC     PNF     Horizontal abd at 90 abd 2 x 10 green TB -      Dynamic Stability     Ball on wall Push ups on wall      Push ups on ball on wall 10 x 5 secs    90/90 ball taps     Body blade     Horizontal abd ball taps at 90 and 120 2 x 20 taps each spot, 2 lb    Plyoback     UK flex     Manual tx     PROM L shoulder    STM            Patient education: Pt was educated on PT diagnosis, prognosis, and plan of care. Reviewed insurance benefits for physical therapy in an outpatient hospital based setting with the patient, including copay of $40 and allowable visit number.  Pt was informed of possible out of pocket costs      Therapeutic Exercise and NMR EXR  [x] (38943) Provided verbal/tactile cueing for activities related to strengthening, flexibility, endurance, ROM  for improvements in scapular, scapulothoracic and UE control with self care, reaching, carrying, lifting, house/yardwork, driving/computer work.    [] (22223) Provided verbal/tactile cueing for activities related to improving balance, coordination, kinesthetic sense, posture, motor skill, proprioception  to assist with  scapular, scapulothoracic and UE control with self care, reaching, carrying, lifting, house/yardwork, driving/computer work. Therapeutic Activities:    [x] (80950 or 75275) Provided verbal/tactile cueing for activities related to improving balance, coordination, kinesthetic sense, posture, motor skill, proprioception and motor activation to allow for proper function of scapular, scapulothoracic and UE control with self care, carrying, lifting, driving/computer work. Home Exercise Program:    [x] (64031) Reviewed/Progressed HEP activities related to strengthening, flexibility, endurance, ROM of scapular, scapulothoracic and UE control with self care, reaching, carrying, lifting, house/yardwork, driving/computer work  [] (04559) Reviewed/Progressed HEP activities related to improving balance, coordination, kinesthetic sense, posture, motor skill, proprioception of scapular, scapulothoracic and UE control with self care, reaching, carrying, lifting, house/yardwork, driving/computer work    Access Code: [email or web address]  URL: [email or web address]  Date: 07/12/2022  Prepared by: Teresita Yousif    Exercises  Doorway Pec Stretch at 90 Degrees Abduction - 1 x daily - 7 x weekly - 5 sets - 1 reps - 20-30 hold  Supine Shoulder External Internal Rotation AAROM with Dowel - 2 x daily - 7 x weekly - 10 reps - 1 sets - 10 hold  Supine Scapular Protraction in Flexion with Dumbbells - 1 x daily - 3 x weekly - 3 sets - 10 reps  Shoulder External Rotation Reactive Isometrics - 1 x daily - 3 x weekly - 1 sets - 10 reps - 5 hold  Seated Shoulder External Rotation in Abduction Supported with Dumbbell - 1 x daily - 3 x weekly - 3 sets - 10 reps  Standing Shoulder Horizontal Abduction with Resistance - 1 x daily - 3 x weekly - 3 sets - 10 reps  Prone Shoulder Horizontal Abduction - 1 x daily - 3 x weekly - 2 sets - 10 reps - 2 hold  Standing Shoulder Scaption - 1 x daily - 3 x weekly - 3 sets - 10 reps  Shoulder External Rotation with Anchored Resistance - 1 x daily - 3 x weekly - 3 sets - 10 reps  Scapular Retraction with Resistance - 1 x daily - 3 x weekly - 10 reps - 3 sets  Shoulder extension with resistance - Neutral - 1 x daily - 3 x weekly - 3 sets - 10 reps  Seated Scapular Retraction - 2 x daily - 7 x weekly - 10 reps - 1 sets - 10 hold  Wall Push Up - 1 x daily - 3 x weekly - 2-3 sets - 10 reps - 2-3 hold    Manual Treatments:  PROM / STM / Oscillations-Mobs:  G-I, II, III, IV (PA's, Inf., Post.)  [] (84817) Provided manual therapy to mobilize soft tissue/joints of cervical/CT, scapular GHJ and UE for the purpose of modulating pain, promoting relaxation,  increasing ROM, reducing/eliminating soft tissue swelling/inflammation/restriction, improving soft tissue extensibility and allowing for proper ROM for normal function with self care, reaching, carrying, lifting, house/yardwork, driving/computer work    Modalities:      Charges:  Timed Code Treatment Minutes: 41   Total Treatment Minutes: 45     [] EVAL (LOW) 96851   [] EVAL (MOD) 20987   [] EVAL (HIGH) 62269   [] RE-EVAL   [x] FD(90435) x1     [] IONTO  [x] NMR (51686) x  1   [] VASO  [] Manual (55018) x     [] Other:  [x] TA x 1     [] Mech Traction (31073)  [] ES(attended) (08768)      [] ES (un) (22790):       Bettie Sifuentes stated goal: reduce pain     Therapist goals for Patient:   Short Term Goals: To be achieved in: 2 weeks  1. Independent in HEP and progression per patient tolerance, in order to prevent re-injury. []? Progressing: [x]? Met: []? Not Met: []? Adjusted   2. Patient will have a decrease in pain to facilitate improvement in movement, function, and ADLs as indicated by Functional Deficits. []? Progressing: [x]? Met: []? Not Met: []? Adjusted     Long Term Goals: To be achieved in: 4-6 weeks   1. Disability index score of 65 or more for the FOTO shoulder to assist with reaching prior level of function. []? Progressing: []? Met: [x]? Not Met: []? Adjusted  2.  Patient will demonstrate increased L  shoulder AROM to 80 ER at 80 abd to allow for proper joint functioning as indicated by patients Functional Deficits. []? Progressing: [x]? Met: []? Not Met: []? Adjusted  3. Patient will demonstrate an increase in L shoulder strength to 4+/5 flex, abd, ER in UE to allow for proper functional mobility as indicated by patients Functional Deficits. []? Progressing: [x]? Met: []? Not Met: []? Adjusted  4. Patient will return to pushing up with hands for supine to sit and sit to stand transfers without increased symptoms or restriction. []? Progressing: [x]? Met: []? Not Met: []? Adjusted  5. Pt will return to lifting light objects overhead for ADLs without pain in L shoulder. [x]? Progressing: []? Met: [x]? Not Met: []? Adjusted         Overall Progression Towards Functional goals/ Treatment Progress Update:  [x] Patient is progressing as expected towards functional goals listed. [] Progression is slowed due to complexities/Impairments listed. [] Progression has been slowed due to co-morbidities. [] Plan just implemented, too soon to assess goals progression <30days   [] Goals require adjustment due to lack of progress  [] Patient is not progressing as expected and requires additional follow up with physician  [] Other    Prognosis for POC: [x] Good [] Fair  [] Poor      Patient requires continued skilled intervention: [x] Yes  [] No      ASSESSMENT:      Treatment/Activity Tolerance:  [x] Patient tolerated treatment well [] Patient limited by fatique  [] Patient limited by pain  [] Patient limited by other medical complications  [x] Other: Pt does continue to be improving with his shoulder pain with less pain reaching overhead. However he still has some pain with lifting heavier weight over shoulder height. He does show good motion now throughout shoulder other than mild tightness in ER. His strength is also progressing well with mild pain still with resisted flex.  He was able to progress in resistance on several exercises today and work on more strength/endurance away from body. He was given an updated HEP again to work on strength every other day. He did need cueing throughout exercises to improve posture and scapular retraction before starting exercises. He will follow up in a month again to check on progress. He will continue to benefit from skilled PT to progress shoulder strength to improve ability to lift overhead and perform household work without limitation in L UE. Return to Play: (if applicable)   []  Stage 1: Intro to Strength   []  Stage 2: Return to Run and Strength   []  Stage 3: Return to Jump and Strength   []  Stage 4: Dynamic Strength and Agility   []  Stage 5: Sport Specific Training     []  Ready to Return to Play, Meets All Above Stages   []  Not Ready for Return to Sports   Comments:            Prognosis: [x] Good [] Fair  [] Poor    Patient Requires Follow-up: [x] Yes  [] No    PLAN: fu in about a month (7/12/22)  [x] Continue per plan of care [] Alter current plan (see comments above)  [] Plan of care initiated [] Hold pending MD visit [] Discharge    Note: If patient does not return for scheduled/ recommended follow up visits, this note will serve as a discharge from care along with most recent update on progress.      Electronically signed by: Sameer Hale, PT, DPT

## 2022-08-09 ENCOUNTER — HOSPITAL ENCOUNTER (OUTPATIENT)
Dept: PHYSICAL THERAPY | Age: 67
Setting detail: THERAPIES SERIES
Discharge: HOME | End: 2022-08-09
Payer: MEDICARE

## 2022-08-09 PROCEDURE — 97110 THERAPEUTIC EXERCISES: CPT | Performed by: PHYSICAL THERAPIST

## 2022-08-09 PROCEDURE — 97112 NEUROMUSCULAR REEDUCATION: CPT | Performed by: PHYSICAL THERAPIST

## 2022-08-09 PROCEDURE — 97530 THERAPEUTIC ACTIVITIES: CPT | Performed by: PHYSICAL THERAPIST

## 2022-08-09 NOTE — PLAN OF CARE
Information:  Diagnosis: M75.82 (ICD-10-CM) - Rotator cuff tendinitis, left  Treatment Diagnosis: M25.512 (L) shoulder pain  Insurance/Certification information:  PT Insurance Information: [employer], no auth, $40 copay  Physician Information:  Referring Practitioner: Benjy Tate MD  Has the plan of care been signed (Y/N):        [x]  Yes  []  No     Date of Patient follow up with Physician:       Is this a Progress Report:     [x]  Yes  []  No        If Yes:  Date Range for reporting period:  Beginning- 3/24/2022   Ending -8/9/22    Progress report will be due (10 Rx or 30 days whichever is less):      Recertification will be due (POC Duration  / 90 days whichever is less): as above          Visit # Insurance Allowable Auth Required   8 [place] []  Yes [x]  No        Functional Scale: FOTO shoulder: 67  functional     Date assessed:  8/9/2022     Latex Allergy:  [x]NO      []YES  Preferred Language for Healthcare:   [x]English       []other:      Pain level:   0/10 at rest  on 8/9/2022  SUBJECTIVE:   Pt says shoulder is feeling good. Sleeping and pushing himself up in bed is better. He says lifting weight overhead is better. Just pressure in shoulder. Overall feeling pretty good. Cannot throw with his L UE though. Exercises are going well without a problem.      OBJECTIVE:   Observation:   Test measurements:  CERV ROM       Cervical Flexion     Cervical Extension     Cervical SB     Cervical rotation                   ROM PROM AROM  Comment     L R L- 8/9/22 R     Flexion     163 165     Abduction     180 185     ER at side     85 65     ER at 90 abd     89 96     BB IR     T 9 T10     IR at 90 abd     67 65     Other             Other                    Strength L- 8/9/22 R Comment   Flexion 4+ mild pain 4+     Abduction 4+  4+     ER 4+ mild pain 4     IR 5 5     Supraspinatus         Upper Trap         Lower Trap         Mid Trap         Rhomboids         Biceps 5 5     Triceps 5 5     Horizontal Abduction         Horizontal Adduction         Lats            Orthopedic Special Tests:   Special Tests Left Right   Apley Scratch IR:  ER:   Cross body: IR:  ER:  Cross Body:   Neer's       Full Can       Empty Can       Donmanjita        Nerve Tension Testing       Speed's     Tirado's        Spurling's       Repeated Scaption       Drop Arm (supraspinatus)       ER lag sign (infraspinatus)       Belly Press (subscapularis       Lift off (subscapularis)       Apprehension test       ER internal impingement test                                        Reflexes/Sensation (myotomes/dermatomes): n/t     Joint mobility: n/t              []Normal                       []Hypo              []Hyper     Palpation:no longer tender throughout shoulder  Functional Mobility/Transfers: Indep     Posture: forward head and shoulders B     Bandages/Dressings/Incisions: n/a     Gait: (include devices/WB status) Indep    RESTRICTIONS/PRECAUTIONS: lumbar discectomy in 2010; knee surgery 2016    Exercises/Interventions:     Exercise/Equipment Resistance/Repetitions Other comments   Aerobic Conditioning     Aerodyne          Stretching/PROM     ER stretch supine    ER stretch seated     Table Slides     Wall slides      UE [place]     Pulleys     Pendulum     BB IR    SL IR    Pec doorway stretch 3 x 30  secs    CBA stretch     UT stretch     LS stretch     Isometrics     Retraction         Weight shift     Flexion    Abduction    External Rotation    Internal Rotation     Biceps     Triceps          PRE's     Scaption 3 x 10 3 lbs B Cues for improved posture/scap ret first   Abduction     External Rotation- SL     Internal Rotation     Shrugs     EXT- prone     Reverse Flys- prone     Y's - prone     Prone ER at 90 abd     Serratus    Horizontal Abd with ER     Biceps     Triceps     Retraction     External rotation at 90 abd (candech hiker) 2x 10 8 lbs Cues to improve scap ret during exercise   Cable Column/Theraband     ER isometric to upper cut 10 x 5 secs hold yellow TB    External Rotation 3 x 10 green TB    Internal Rotation 3 x 10 orange TB    Shrugs     Lats     Ext    Flex     Scapular Retraction 2 x 10 CC 55 lbs     BIC     TRIC     PNF     Horizontal abd at 90 abd 1 x 10, 1 x 5  green TB -     Dynamic Stability     Ball on wall Push ups on wall      Push ups on ball on wall 10 x 5 secs    90/90 ball taps 3 x 15 taps 2 lb ball    Body blade     Horizontal abd ball taps at 90 and 120 2   Plyoback     UK flex    Manual tx     PROM L shoulder    STM            Patient education: Pt was educated on PT diagnosis, prognosis, and plan of care. Reviewed insurance benefits for physical therapy in an outpatient hospital based setting with the patient, including copay of $40 and allowable visit number. Pt was informed of possible out of pocket costs      Therapeutic Exercise and NMR EXR  [x] (04971) Provided verbal/tactile cueing for activities related to strengthening, flexibility, endurance, ROM  for improvements in scapular, scapulothoracic and UE control with self care, reaching, carrying, lifting, house/yardwork, driving/computer work.    [] (81510) Provided verbal/tactile cueing for activities related to improving balance, coordination, kinesthetic sense, posture, motor skill, proprioception  to assist with  scapular, scapulothoracic and UE control with self care, reaching, carrying, lifting, house/yardwork, driving/computer work. Therapeutic Activities:    [x] (68258 or 38008) Provided verbal/tactile cueing for activities related to improving balance, coordination, kinesthetic sense, posture, motor skill, proprioception and motor activation to allow for proper function of scapular, scapulothoracic and UE control with self care, carrying, lifting, driving/computer work.      Home Exercise Program:    [x] (16440) Reviewed/Progressed HEP activities related to strengthening, flexibility, endurance, ROM of scapular, scapulothoracic and UE control with self care, reaching, carrying, lifting, house/yardwork, driving/computer work  [] (95385) Reviewed/Progressed HEP activities related to improving balance, coordination, kinesthetic sense, posture, motor skill, proprioception of scapular, scapulothoracic and UE control with self care, reaching, carrying, lifting, house/yardwork, driving/computer work    Access Code: [email or web address]  URL: [employer]/  Date: 07/12/2022  Prepared by: Zhen Calderón  Doorway Pec Stretch at 90 Degrees Abduction - 1 x daily - 7 x weekly - 5 sets - 1 reps - 20-30 hold  Supine Shoulder External Internal Rotation AAROM with Dowel - 2 x daily - 7 x weekly - 10 reps - 1 sets - 10 hold  Supine Scapular Protraction in Flexion with Dumbbells - 1 x daily - 3 x weekly - 3 sets - 10 reps  Shoulder External Rotation Reactive Isometrics - 1 x daily - 3 x weekly - 1 sets - 10 reps - 5 hold  Seated Shoulder External Rotation in Abduction Supported with Dumbbell - 1 x daily - 3 x weekly - 3 sets - 10 reps  Standing Shoulder Horizontal Abduction with Resistance - 1 x daily - 3 x weekly - 3 sets - 10 reps  Prone Shoulder Horizontal Abduction - 1 x daily - 3 x weekly - 2 sets - 10 reps - 2 hold  Standing Shoulder Scaption - 1 x daily - 3 x weekly - 3 sets - 10 reps  Shoulder External Rotation with Anchored Resistance - 1 x daily - 3 x weekly - 3 sets - 10 reps  Scapular Retraction with Resistance - 1 x daily - 3 x weekly - 10 reps - 3 sets  Shoulder extension with resistance - Neutral - 1 x daily - 3 x weekly - 3 sets - 10 reps  Seated Scapular Retraction - 2 x daily - 7 x weekly - 10 reps - 1 sets - 10 hold  Wall Push Up - 1 x daily - 3 x weekly - 2-3 sets - 10 reps - 2-3 hold    Manual Treatments:  PROM / STM / Oscillations-Mobs:  G-I, II, III, IV (PA's, Inf., Post.)  [] (51160) Provided manual therapy to mobilize soft tissue/joints of cervical/CT, scapular GHJ and UE for the purpose of modulating pain, promoting relaxation,  increasing ROM, reducing/eliminating soft tissue swelling/inflammation/restriction, improving soft tissue extensibility and allowing for proper ROM for normal function with self care, reaching, carrying, lifting, house/yardwork, driving/computer work    Modalities:      Charges:  Timed Code Treatment Minutes: 38   Total Treatment Minutes: 38     [] EVAL (LOW) 80207   [] EVAL (MOD) 77314   [] EVAL (HIGH) 27157   [] RE-EVAL   [x] YO(14406) x1     [] IONTO  [x] NMR (34052) x  1   [] VASO  [] Manual (12423) x     [] Other:  [x] TA x 1     [] Mech Traction (24510)  [] ES(attended) (27755)      [] ES (un) (01315):       Sujey Muñoz stated goal: reduce pain     Therapist goals for Patient:   Short Term Goals: To be achieved in: 2 weeks  1. Independent in HEP and progression per patient tolerance, in order to prevent re-injury. [] Progressing: [x] Met: [] Not Met: [] Adjusted   2. Patient will have a decrease in pain to facilitate improvement in movement, function, and ADLs as indicated by Functional Deficits. [] Progressing: [x] Met: [] Not Met: [] Adjusted     Long Term Goals: To be achieved in: 4-6 weeks   1. Disability index score of 65 or more for the FOTO shoulder to assist with reaching prior level of function. [] Progressing: [x] Met: [] Not Met: [] Adjusted  2. Patient will demonstrate increased L  shoulder AROM to 80 ER at 90 abd to allow for proper joint functioning as indicated by patients Functional Deficits. [] Progressing: [x] Met: [] Not Met: [] Adjusted  3. Patient will demonstrate an increase in L shoulder strength to 4+/5 flex, abd, ER in UE to allow for proper functional mobility as indicated by patients Functional Deficits. [] Progressing: [x] Met: [] Not Met: [] Adjusted  4. Patient will return to pushing up with hands for supine to sit and sit to stand transfers without increased symptoms or restriction. [] Progressing: [x] Met: [] Not Met: [] Adjusted  5.  Pt will return